# Patient Record
Sex: FEMALE | Employment: UNEMPLOYED | URBAN - METROPOLITAN AREA
[De-identification: names, ages, dates, MRNs, and addresses within clinical notes are randomized per-mention and may not be internally consistent; named-entity substitution may affect disease eponyms.]

---

## 2019-04-25 ENCOUNTER — APPOINTMENT (OUTPATIENT)
Dept: ULTRASOUND IMAGING | Age: 29
End: 2019-04-25
Payer: COMMERCIAL

## 2019-04-25 ENCOUNTER — HOSPITAL ENCOUNTER (INPATIENT)
Age: 29
LOS: 1 days | Discharge: HOME | DRG: 531 | End: 2019-04-27
Attending: EMERGENCY MEDICINE | Admitting: OBSTETRICS & GYNECOLOGY
Payer: COMMERCIAL

## 2019-04-25 ENCOUNTER — HOSPITAL ENCOUNTER (EMERGENCY)
Age: 29
Discharge: ANOTHER HOSPITAL | End: 2019-04-25
Attending: EMERGENCY MEDICINE
Payer: COMMERCIAL

## 2019-04-25 ENCOUNTER — APPOINTMENT (OUTPATIENT)
Dept: CT IMAGING | Age: 29
End: 2019-04-25
Payer: COMMERCIAL

## 2019-04-25 VITALS
OXYGEN SATURATION: 100 % | TEMPERATURE: 98.5 F | BODY MASS INDEX: 40.18 KG/M2 | HEART RATE: 73 BPM | SYSTOLIC BLOOD PRESSURE: 132 MMHG | HEIGHT: 66 IN | DIASTOLIC BLOOD PRESSURE: 83 MMHG | RESPIRATION RATE: 16 BRPM | WEIGHT: 250 LBS

## 2019-04-25 DIAGNOSIS — N70.93 TOA (TUBO-OVARIAN ABSCESS): ICD-10-CM

## 2019-04-25 DIAGNOSIS — R10.2 PELVIC PAIN: Primary | ICD-10-CM

## 2019-04-25 DIAGNOSIS — R10.2 PELVIC PAIN: ICD-10-CM

## 2019-04-25 DIAGNOSIS — N83.299 OVARIAN CYST, COMPLEX: Primary | ICD-10-CM

## 2019-04-25 LAB
A/G RATIO: 1.1 (ref 1.1–2.2)
ALBUMIN SERPL-MCNC: 3.8 G/DL (ref 3.4–5)
ALP BLD-CCNC: 74 U/L (ref 40–129)
ALT SERPL-CCNC: 14 U/L (ref 10–40)
ANION GAP SERPL CALCULATED.3IONS-SCNC: 7 MMOL/L (ref 3–16)
AST SERPL-CCNC: 17 U/L (ref 15–37)
BACTERIA WET PREP: NORMAL
BACTERIA: ABNORMAL /HPF
BASOPHILS ABSOLUTE: 0.1 K/UL (ref 0–0.2)
BASOPHILS RELATIVE PERCENT: 0.9 %
BILIRUB SERPL-MCNC: 0.3 MG/DL (ref 0–1)
BILIRUBIN URINE: NEGATIVE
BLOOD, URINE: NEGATIVE
BUN BLDV-MCNC: 7 MG/DL (ref 7–20)
CALCIUM SERPL-MCNC: 8.9 MG/DL (ref 8.3–10.6)
CHLORIDE BLD-SCNC: 107 MMOL/L (ref 99–110)
CLARITY: CLEAR
CLUE CELLS: NORMAL
CO2: 22 MMOL/L (ref 21–32)
COLOR: YELLOW
CREAT SERPL-MCNC: 0.7 MG/DL (ref 0.6–1.1)
EOSINOPHILS ABSOLUTE: 0.2 K/UL (ref 0–0.6)
EOSINOPHILS RELATIVE PERCENT: 1.6 %
EPITHELIAL CELLS WET PREP: NORMAL
EPITHELIAL CELLS, UA: ABNORMAL /HPF
GFR AFRICAN AMERICAN: >60
GFR NON-AFRICAN AMERICAN: >60
GLOBULIN: 3.6 G/DL
GLUCOSE BLD-MCNC: 85 MG/DL (ref 70–99)
GLUCOSE URINE: NEGATIVE MG/DL
HCG QUALITATIVE: NEGATIVE
HCT VFR BLD CALC: 42.3 % (ref 36–48)
HEMOGLOBIN: 14.4 G/DL (ref 12–16)
KETONES, URINE: NEGATIVE MG/DL
LACTIC ACID, SEPSIS: 1 MMOL/L (ref 0.4–1.9)
LEUKOCYTE ESTERASE, URINE: NEGATIVE
LIPASE: 39 U/L (ref 13–60)
LYMPHOCYTES ABSOLUTE: 2.2 K/UL (ref 1–5.1)
LYMPHOCYTES RELATIVE PERCENT: 23.4 %
MCH RBC QN AUTO: 28.9 PG (ref 26–34)
MCHC RBC AUTO-ENTMCNC: 33.9 G/DL (ref 31–36)
MCV RBC AUTO: 85.1 FL (ref 80–100)
MICROSCOPIC EXAMINATION: YES
MONOCYTES ABSOLUTE: 0.7 K/UL (ref 0–1.3)
MONOCYTES RELATIVE PERCENT: 7.8 %
MUCUS: ABNORMAL /LPF
NEUTROPHILS ABSOLUTE: 6.2 K/UL (ref 1.7–7.7)
NEUTROPHILS RELATIVE PERCENT: 66.3 %
NITRITE, URINE: NEGATIVE
PDW BLD-RTO: 14.1 % (ref 12.4–15.4)
PH UA: 7 (ref 5–8)
PLATELET # BLD: 255 K/UL (ref 135–450)
PMV BLD AUTO: 7.3 FL (ref 5–10.5)
POTASSIUM SERPL-SCNC: 3.9 MMOL/L (ref 3.5–5.1)
PROTEIN UA: 30 MG/DL
RBC # BLD: 4.97 M/UL (ref 4–5.2)
RBC UA: ABNORMAL /HPF (ref 0–2)
RBC WET PREP: NORMAL
SODIUM BLD-SCNC: 136 MMOL/L (ref 136–145)
SOURCE WET PREP: NORMAL
SPECIFIC GRAVITY UA: 1.02 (ref 1–1.03)
TOTAL PROTEIN: 7.4 G/DL (ref 6.4–8.2)
TRICHOMONAS PREP: NORMAL
URINE REFLEX TO CULTURE: ABNORMAL
URINE TYPE: ABNORMAL
UROBILINOGEN, URINE: 0.2 E.U./DL
WBC # BLD: 9.4 K/UL (ref 4–11)
WBC UA: ABNORMAL /HPF (ref 0–5)
WBC WET PREP: NORMAL
YEAST WET PREP: NORMAL

## 2019-04-25 PROCEDURE — 96365 THER/PROPH/DIAG IV INF INIT: CPT

## 2019-04-25 PROCEDURE — 83605 ASSAY OF LACTIC ACID: CPT

## 2019-04-25 PROCEDURE — 87210 SMEAR WET MOUNT SALINE/INK: CPT

## 2019-04-25 PROCEDURE — 84703 CHORIONIC GONADOTROPIN ASSAY: CPT

## 2019-04-25 PROCEDURE — 80053 COMPREHEN METABOLIC PANEL: CPT

## 2019-04-25 PROCEDURE — 76830 TRANSVAGINAL US NON-OB: CPT

## 2019-04-25 PROCEDURE — 87491 CHLMYD TRACH DNA AMP PROBE: CPT

## 2019-04-25 PROCEDURE — 36415 COLL VENOUS BLD VENIPUNCTURE: CPT

## 2019-04-25 PROCEDURE — 96375 TX/PRO/DX INJ NEW DRUG ADDON: CPT

## 2019-04-25 PROCEDURE — 93975 VASCULAR STUDY: CPT

## 2019-04-25 PROCEDURE — 85025 COMPLETE CBC W/AUTO DIFF WBC: CPT

## 2019-04-25 PROCEDURE — 99285 EMERGENCY DEPT VISIT HI MDM: CPT

## 2019-04-25 PROCEDURE — 2580000003 HC RX 258: Performed by: NURSE PRACTITIONER

## 2019-04-25 PROCEDURE — 99284 EMERGENCY DEPT VISIT MOD MDM: CPT

## 2019-04-25 PROCEDURE — 96361 HYDRATE IV INFUSION ADD-ON: CPT

## 2019-04-25 PROCEDURE — 6360000004 HC RX CONTRAST MEDICATION: Performed by: NURSE PRACTITIONER

## 2019-04-25 PROCEDURE — 83690 ASSAY OF LIPASE: CPT

## 2019-04-25 PROCEDURE — 74177 CT ABD & PELVIS W/CONTRAST: CPT

## 2019-04-25 PROCEDURE — 6370000000 HC RX 637 (ALT 250 FOR IP): Performed by: NURSE PRACTITIONER

## 2019-04-25 PROCEDURE — 81001 URINALYSIS AUTO W/SCOPE: CPT

## 2019-04-25 PROCEDURE — 6360000002 HC RX W HCPCS: Performed by: NURSE PRACTITIONER

## 2019-04-25 PROCEDURE — 87591 N.GONORRHOEAE DNA AMP PROB: CPT

## 2019-04-25 PROCEDURE — 76856 US EXAM PELVIC COMPLETE: CPT

## 2019-04-25 RX ORDER — ONDANSETRON 2 MG/ML
4 INJECTION INTRAMUSCULAR; INTRAVENOUS EVERY 30 MIN PRN
Status: DISCONTINUED | OUTPATIENT
Start: 2019-04-25 | End: 2019-04-25 | Stop reason: HOSPADM

## 2019-04-25 RX ORDER — KETOROLAC TROMETHAMINE 30 MG/ML
30 INJECTION, SOLUTION INTRAMUSCULAR; INTRAVENOUS ONCE
Status: COMPLETED | OUTPATIENT
Start: 2019-04-25 | End: 2019-04-25

## 2019-04-25 RX ORDER — 0.9 % SODIUM CHLORIDE 0.9 %
1000 INTRAVENOUS SOLUTION INTRAVENOUS ONCE
Status: COMPLETED | OUTPATIENT
Start: 2019-04-25 | End: 2019-04-25

## 2019-04-25 RX ORDER — DOXYCYCLINE HYCLATE 100 MG
100 TABLET ORAL ONCE
Status: COMPLETED | OUTPATIENT
Start: 2019-04-25 | End: 2019-04-25

## 2019-04-25 RX ADMIN — KETOROLAC TROMETHAMINE 30 MG: 30 INJECTION, SOLUTION INTRAMUSCULAR at 17:52

## 2019-04-25 RX ADMIN — IOPAMIDOL 75 ML: 755 INJECTION, SOLUTION INTRAVENOUS at 18:39

## 2019-04-25 RX ADMIN — DOXYCYCLINE HYCLATE 100 MG: 100 TABLET, COATED ORAL at 21:47

## 2019-04-25 RX ADMIN — CEFTRIAXONE 2 G: 2 INJECTION, POWDER, FOR SOLUTION INTRAMUSCULAR; INTRAVENOUS at 21:47

## 2019-04-25 RX ADMIN — ONDANSETRON 4 MG: 2 INJECTION INTRAMUSCULAR; INTRAVENOUS at 17:52

## 2019-04-25 RX ADMIN — SODIUM CHLORIDE 1000 ML: 9 INJECTION, SOLUTION INTRAVENOUS at 17:52

## 2019-04-25 ASSESSMENT — ENCOUNTER SYMPTOMS
VOMITING: 0
ABDOMINAL PAIN: 1
CHEST TIGHTNESS: 0
DIARRHEA: 0
SHORTNESS OF BREATH: 0
NAUSEA: 0

## 2019-04-25 ASSESSMENT — PAIN SCALES - GENERAL
PAINLEVEL_OUTOF10: 3
PAINLEVEL_OUTOF10: 5
PAINLEVEL_OUTOF10: 3
PAINLEVEL_OUTOF10: 5

## 2019-04-25 ASSESSMENT — PAIN DESCRIPTION - PAIN TYPE: TYPE: ACUTE PAIN

## 2019-04-25 ASSESSMENT — PAIN DESCRIPTION - FREQUENCY: FREQUENCY: CONTINUOUS

## 2019-04-25 ASSESSMENT — PAIN DESCRIPTION - DESCRIPTORS: DESCRIPTORS: ACHING;DULL

## 2019-04-25 ASSESSMENT — PAIN DESCRIPTION - LOCATION
LOCATION: ABDOMEN
LOCATION: ABDOMEN

## 2019-04-25 ASSESSMENT — PAIN DESCRIPTION - ORIENTATION: ORIENTATION: RIGHT;LOWER

## 2019-04-25 NOTE — ED NOTES
Report given to Felix Singleton, [facility].       Jyoti Gatica RN  04/25/19 6161

## 2019-04-25 NOTE — ED NOTES
Patient medicated per [facility] for pain and nausea. Pt tolerated well. Will continue to monitor.       Berry Weeks RN  04/25/19 5405

## 2019-04-25 NOTE — ED NOTES
Pt to [address] [facility] [address] via stretcher.       Corrine Calderon RN  04/25/19 1945

## 2019-04-26 PROBLEM — N70.93 TOA (TUBO-OVARIAN ABSCESS): Status: ACTIVE | Noted: 2019-04-26

## 2019-04-26 LAB
BASOPHILS ABSOLUTE: 0 K/UL (ref 0–0.2)
BASOPHILS ABSOLUTE: 0.1 K/UL (ref 0–0.2)
BASOPHILS RELATIVE PERCENT: 0.5 %
BASOPHILS RELATIVE PERCENT: 0.7 %
C TRACH DNA GENITAL QL NAA+PROBE: NEGATIVE
EOSINOPHILS ABSOLUTE: 0.1 K/UL (ref 0–0.6)
EOSINOPHILS ABSOLUTE: 0.2 K/UL (ref 0–0.6)
EOSINOPHILS RELATIVE PERCENT: 1.3 %
EOSINOPHILS RELATIVE PERCENT: 2.1 %
HCT VFR BLD CALC: 37.8 % (ref 36–48)
HCT VFR BLD CALC: 38.7 % (ref 36–48)
HEMOGLOBIN: 12.8 G/DL (ref 12–16)
HEMOGLOBIN: 13.3 G/DL (ref 12–16)
LYMPHOCYTES ABSOLUTE: 2.2 K/UL (ref 1–5.1)
LYMPHOCYTES ABSOLUTE: 3 K/UL (ref 1–5.1)
LYMPHOCYTES RELATIVE PERCENT: 24.8 %
LYMPHOCYTES RELATIVE PERCENT: 32.3 %
MCH RBC QN AUTO: 28.4 PG (ref 26–34)
MCH RBC QN AUTO: 28.8 PG (ref 26–34)
MCHC RBC AUTO-ENTMCNC: 33.9 G/DL (ref 31–36)
MCHC RBC AUTO-ENTMCNC: 34.4 G/DL (ref 31–36)
MCV RBC AUTO: 83.6 FL (ref 80–100)
MCV RBC AUTO: 83.8 FL (ref 80–100)
MONOCYTES ABSOLUTE: 0.7 K/UL (ref 0–1.3)
MONOCYTES ABSOLUTE: 0.8 K/UL (ref 0–1.3)
MONOCYTES RELATIVE PERCENT: 7.4 %
MONOCYTES RELATIVE PERCENT: 9.1 %
N. GONORRHOEAE DNA: NEGATIVE
NEUTROPHILS ABSOLUTE: 5.3 K/UL (ref 1.7–7.7)
NEUTROPHILS ABSOLUTE: 5.7 K/UL (ref 1.7–7.7)
NEUTROPHILS RELATIVE PERCENT: 57.5 %
NEUTROPHILS RELATIVE PERCENT: 64.3 %
PDW BLD-RTO: 13.9 % (ref 12.4–15.4)
PDW BLD-RTO: 14 % (ref 12.4–15.4)
PLATELET # BLD: 209 K/UL (ref 135–450)
PLATELET # BLD: 241 K/UL (ref 135–450)
PMV BLD AUTO: 7.2 FL (ref 5–10.5)
PMV BLD AUTO: 7.6 FL (ref 5–10.5)
RBC # BLD: 4.51 M/UL (ref 4–5.2)
RBC # BLD: 4.64 M/UL (ref 4–5.2)
SPECIMEN STATUS: NORMAL
WBC # BLD: 8.8 K/UL (ref 4–11)
WBC # BLD: 9.2 K/UL (ref 4–11)

## 2019-04-26 PROCEDURE — 2580000003 HC RX 258: Performed by: OBSTETRICS & GYNECOLOGY

## 2019-04-26 PROCEDURE — 36415 COLL VENOUS BLD VENIPUNCTURE: CPT

## 2019-04-26 PROCEDURE — 99232 SBSQ HOSP IP/OBS MODERATE 35: CPT | Performed by: OBSTETRICS & GYNECOLOGY

## 2019-04-26 PROCEDURE — 1200000000 HC SEMI PRIVATE

## 2019-04-26 PROCEDURE — 6360000002 HC RX W HCPCS: Performed by: OBSTETRICS & GYNECOLOGY

## 2019-04-26 PROCEDURE — 85025 COMPLETE CBC W/AUTO DIFF WBC: CPT

## 2019-04-26 PROCEDURE — 87086 URINE CULTURE/COLONY COUNT: CPT

## 2019-04-26 PROCEDURE — 6370000000 HC RX 637 (ALT 250 FOR IP): Performed by: OBSTETRICS & GYNECOLOGY

## 2019-04-26 RX ORDER — ONDANSETRON 2 MG/ML
4 INJECTION INTRAMUSCULAR; INTRAVENOUS EVERY 6 HOURS PRN
Status: DISCONTINUED | OUTPATIENT
Start: 2019-04-25 | End: 2019-04-27 | Stop reason: HOSPADM

## 2019-04-26 RX ORDER — DOXYCYCLINE HYCLATE 100 MG
100 TABLET ORAL EVERY 12 HOURS SCHEDULED
Status: DISCONTINUED | OUTPATIENT
Start: 2019-04-26 | End: 2019-04-27 | Stop reason: HOSPADM

## 2019-04-26 RX ORDER — SODIUM CHLORIDE 0.9 % (FLUSH) 0.9 %
10 SYRINGE (ML) INJECTION EVERY 12 HOURS SCHEDULED
Status: DISCONTINUED | OUTPATIENT
Start: 2019-04-26 | End: 2019-04-27 | Stop reason: HOSPADM

## 2019-04-26 RX ORDER — ACETAMINOPHEN 325 MG/1
650 TABLET ORAL EVERY 4 HOURS PRN
Status: DISCONTINUED | OUTPATIENT
Start: 2019-04-25 | End: 2019-04-27 | Stop reason: HOSPADM

## 2019-04-26 RX ORDER — SODIUM CHLORIDE 0.9 % (FLUSH) 0.9 %
10 SYRINGE (ML) INJECTION PRN
Status: DISCONTINUED | OUTPATIENT
Start: 2019-04-25 | End: 2019-04-27 | Stop reason: HOSPADM

## 2019-04-26 RX ORDER — OXYCODONE HYDROCHLORIDE AND ACETAMINOPHEN 5; 325 MG/1; MG/1
1 TABLET ORAL EVERY 4 HOURS PRN
Status: DISCONTINUED | OUTPATIENT
Start: 2019-04-25 | End: 2019-04-27 | Stop reason: HOSPADM

## 2019-04-26 RX ORDER — NICOTINE 21 MG/24HR
1 PATCH, TRANSDERMAL 24 HOURS TRANSDERMAL DAILY
Status: DISCONTINUED | OUTPATIENT
Start: 2019-04-26 | End: 2019-04-27 | Stop reason: HOSPADM

## 2019-04-26 RX ORDER — SODIUM CHLORIDE 9 MG/ML
INJECTION, SOLUTION INTRAVENOUS
Status: DISPENSED
Start: 2019-04-26 | End: 2019-04-26

## 2019-04-26 RX ORDER — KETOROLAC TROMETHAMINE 30 MG/ML
30 INJECTION, SOLUTION INTRAMUSCULAR; INTRAVENOUS EVERY 6 HOURS
Status: DISCONTINUED | OUTPATIENT
Start: 2019-04-26 | End: 2019-04-27 | Stop reason: HOSPADM

## 2019-04-26 RX ORDER — DOCUSATE SODIUM 100 MG/1
100 CAPSULE, LIQUID FILLED ORAL 2 TIMES DAILY
Status: DISCONTINUED | OUTPATIENT
Start: 2019-04-26 | End: 2019-04-27 | Stop reason: HOSPADM

## 2019-04-26 RX ADMIN — DOXYCYCLINE HYCLATE 100 MG: 100 TABLET, COATED ORAL at 09:38

## 2019-04-26 RX ADMIN — ENOXAPARIN SODIUM 40 MG: 40 INJECTION SUBCUTANEOUS at 09:38

## 2019-04-26 RX ADMIN — KETOROLAC TROMETHAMINE 30 MG: 30 INJECTION, SOLUTION INTRAMUSCULAR at 06:30

## 2019-04-26 RX ADMIN — DOCUSATE SODIUM 100 MG: 100 CAPSULE, LIQUID FILLED ORAL at 20:43

## 2019-04-26 RX ADMIN — OXYCODONE HYDROCHLORIDE AND ACETAMINOPHEN 1 TABLET: 5; 325 TABLET ORAL at 20:43

## 2019-04-26 RX ADMIN — KETOROLAC TROMETHAMINE 30 MG: 30 INJECTION, SOLUTION INTRAMUSCULAR at 13:05

## 2019-04-26 RX ADMIN — DOCUSATE SODIUM 100 MG: 100 CAPSULE, LIQUID FILLED ORAL at 09:38

## 2019-04-26 RX ADMIN — CEFOXITIN SODIUM 2 G: 1 POWDER, FOR SOLUTION INTRAVENOUS at 01:28

## 2019-04-26 RX ADMIN — SODIUM CHLORIDE, PRESERVATIVE FREE 10 ML: 5 INJECTION INTRAVENOUS at 20:44

## 2019-04-26 RX ADMIN — DOCUSATE SODIUM 100 MG: 100 CAPSULE, LIQUID FILLED ORAL at 01:27

## 2019-04-26 RX ADMIN — ONDANSETRON 4 MG: 2 INJECTION INTRAMUSCULAR; INTRAVENOUS at 07:33

## 2019-04-26 RX ADMIN — SODIUM CHLORIDE, PRESERVATIVE FREE 10 ML: 5 INJECTION INTRAVENOUS at 09:39

## 2019-04-26 RX ADMIN — DOXYCYCLINE HYCLATE 100 MG: 100 TABLET, COATED ORAL at 20:43

## 2019-04-26 RX ADMIN — CEFOXITIN SODIUM 2 G: 1 POWDER, FOR SOLUTION INTRAVENOUS at 18:09

## 2019-04-26 RX ADMIN — KETOROLAC TROMETHAMINE 30 MG: 30 INJECTION, SOLUTION INTRAMUSCULAR at 18:09

## 2019-04-26 RX ADMIN — KETOROLAC TROMETHAMINE 30 MG: 30 INJECTION, SOLUTION INTRAMUSCULAR at 01:27

## 2019-04-26 RX ADMIN — CEFOXITIN SODIUM 2 G: 1 POWDER, FOR SOLUTION INTRAVENOUS at 13:06

## 2019-04-26 RX ADMIN — CEFOXITIN SODIUM 2 G: 1 POWDER, FOR SOLUTION INTRAVENOUS at 06:30

## 2019-04-26 RX ADMIN — OXYCODONE HYDROCHLORIDE AND ACETAMINOPHEN 1 TABLET: 5; 325 TABLET ORAL at 01:27

## 2019-04-26 ASSESSMENT — ENCOUNTER SYMPTOMS
VOMITING: 1
ABDOMINAL DISTENTION: 0
NAUSEA: 1
SHORTNESS OF BREATH: 0
ABDOMINAL PAIN: 1

## 2019-04-26 ASSESSMENT — PAIN SCALES - GENERAL
PAINLEVEL_OUTOF10: 6
PAINLEVEL_OUTOF10: 6
PAINLEVEL_OUTOF10: 4
PAINLEVEL_OUTOF10: 7
PAINLEVEL_OUTOF10: 4
PAINLEVEL_OUTOF10: 6
PAINLEVEL_OUTOF10: 6

## 2019-04-26 ASSESSMENT — PAIN DESCRIPTION - DIRECTION: RADIATING_TOWARDS: LEFT SIDE

## 2019-04-26 ASSESSMENT — PAIN DESCRIPTION - LOCATION: LOCATION: ABDOMEN

## 2019-04-26 ASSESSMENT — PAIN DESCRIPTION - DESCRIPTORS: DESCRIPTORS: ACHING;DULL

## 2019-04-26 ASSESSMENT — PAIN DESCRIPTION - ORIENTATION: ORIENTATION: RIGHT;LOWER

## 2019-04-26 ASSESSMENT — PAIN DESCRIPTION - PAIN TYPE: TYPE: ACUTE PAIN

## 2019-04-26 ASSESSMENT — PAIN DESCRIPTION - FREQUENCY: FREQUENCY: CONTINUOUS

## 2019-04-26 NOTE — ED NOTES
Pt report given to Mojica Christian . Pt is going to their ER by private vehicle. Pt transfer form sent with pt.       Angelina Obregon RN  04/25/19 3310

## 2019-04-26 NOTE — PROGRESS NOTES
Got report from [facility] in ED. Pt admitted to c330 in stable condition via wheelchair. VSS. Oriented to room and call light and schedule of things. Bed locked and in lowest position. Call light and bedside table within reach. Will continue to monitor.

## 2019-04-26 NOTE — ED PROVIDER NOTES
I independently performed a history and physical on Gustavo Vigil. All diagnostic, treatment, and disposition decisions were made by myself in conjunction with the advanced practice provider. Briefly, this is a 33 y.o. female here for right lower quadrant pain for the last month. The patient states the pain is getting steadily worse. .    On exam, patient did have findings for an abnormal cyst on ultrasound, and we do feel the patient will be transferred to [facility] for evaluation of the possible tubo-ovarian abscess. Patient was started on antibiotics here in the emergency department.     For further details of [facility] emergency department encounter, please see documentation by advanced practice provider Sasha Leigh MD  04/26/19 3127

## 2019-04-26 NOTE — ED NOTES
Admitting physician at bedside assessing patient and discussing plan of care. Patient is alert and oriented and answering questions.       Felder Liming, [place]  04/26/19 3639

## 2019-04-26 NOTE — PROGRESS NOTES
4 Eyes Skin Assessment     The patient is being assess for  Admission    I agree that 2 RN's have performed a thorough Head to Toe Skin Assessment on the patient. ALL assessment sites listed below have been assessed. Areas assessed by both nurses:    [x]   Head, Face, and Ears   [x]   Shoulders, Back, and Chest  [x]   Arms, Elbows, and Hands   [x]   Coccyx, Sacrum, and Ischum  [x]   Legs, Feet, and Heels        Does the Patient have Skin Breakdown?   No         Agusto Prevention initiated:  No   Wound Care Orders initiated:  No      [facility] nurse consulted for Pressure Injury (Stage 3,4, Unstageable, DTI, NWPT, and Complex wounds):  No      Nurse 1 eSignature: Electronically signed by Jose L Chua RN on 4/26/19 at 6:41 AM    **SHARE this note so that the co-signing nurse is able to place an eSignature**    Nurse 2 eSignature: Electronically signed by Sinai Avila RN on 4/26/19 at 6:42 AM

## 2019-04-26 NOTE — H&P
[facility]  Inpatient GYN History and Physical     CC:   Chief Complaint   Patient presents with    Abdominal Pain     pt was evaluated at [place], has cysts on ovaries, was sent here by [facility] to be admitted     Reason for consultation: Pelvic pain, BL ovarian cysts on US and CT     HPI: Julio Villalobos is a 33 y.o. [id number] female who presents after transfer from [facility] ED ([facility]). She presents with complaints of left sided pelvic pain that radiated to her pubic region and into her vagina. States pain has been present for a couple months but worsened 2 weeks ago. States she has been complaining about these issues at home but hasn't \"been heard\". States she has been feeling \"heaviness in abdomen\" and like there \"is a tampon or something stuck in her vagina\" with pressure. Pain became acute earlier today when she sat down from putting daughter down for nap. Pain was severe at that time. Felt nauseous and emesis x 5. Denies fevers / chills. Denies discharge / VB. After pain medication, now 5/10. States pain appeared to be exacerbated by sitting. States intercourse has been very very painful. Vaginal exam at [facility] ED was also very painful, especially when her cervix was palpated. LMP 3/25/19, about a month ago. 6/30 day cycles. Very heavy flow. Pt is sexually active with her . Use Nexplanon for birth control (8/2017). Denies STI. Admits to very painful intercourse recently. Denies hx of PCOS, Endometriosis or PID. She is a [id number], s/p NSVDs. Denies any abdominal or vaginal surgeries in past. States she feels safe at home. Lives with  and two children. Pt is a daily tobacco user / Georgina Deed. Received Cefotetan and Doxy at ED prior to transfer. Review of Systems:   Review of Systems   Constitutional: Negative for chills, fatigue and fever. Respiratory: Negative for shortness of breath. Cardiovascular: Negative for chest pain and palpitations.    Gastrointestinal: Positive for abdominal pain, nausea and vomiting. Negative for abdominal distention. Genitourinary: Positive for dyspareunia, menstrual problem, pelvic pain and vaginal pain. Negative for dysuria. Skin: Negative for wound. Neurological: Positive for dizziness. Negative for seizures and weakness. Psychiatric/Behavioral: Negative for behavioral problems, dysphoric mood and sleep disturbance.      OBGYN Provider : NONE     Gynecologic History:   Denies history of abnormal pap smears  - cannot recall when her last PAP was done but thinks it was her last pregnancy (19 mos)   Denies history of STIs       Obstetrical History:  OB History    None         Past Medical History:   Past Medical History:   Diagnosis Date    Hypertension        Medications:  Current Facility-Administered Medications   Medication Dose Route Frequency Provider Last Rate Last Dose    sodium chloride flush 0.9 % injection 10 mL  10 mL Intravenous 2 times per day Duaine Cecelia, DO        sodium chloride flush 0.9 % injection 10 mL  10 mL Intravenous PRN Duaine Cecelia, DO        acetaminophen (TYLENOL) tablet 650 mg  650 mg Oral Q4H PRN Ji Cecelia, DO        docusate sodium (COLACE) capsule 100 mg  100 mg Oral BID Ji Peckity, DO   100 mg at 04/26/19 0127    ondansetron (ZOFRAN) injection 4 mg  4 mg Intravenous Q6H PRN Jerardoaine Cecelia, DO        enoxaparin (LOVENOX) injection 40 mg  40 mg Subcutaneous Daily Ji Peckity, DO        cefOXitin (MEFOXIN) 2 g in dextrose 5 % 50 mL IVPB  2 g Intravenous 4 times per day Duaine Cecelia,  mL/hr at 04/26/19 0128 2 g at 04/26/19 0128    And    doxycycline hyclate (VIBRA-TABS) tablet 100 mg  100 mg Oral 2 times per day Jerardoaine Cecelia, DO        oxyCODONE-acetaminophen (PERCOCET) 5-325 MG per tablet 1 tablet  1 tablet Oral Q4H PRN Duaine Cecelia, DO   1 tablet at 04/26/19 0127    HYDROmorphone (DILAUDID) injection 0.5 mg  0.5 mg Intravenous Q3H 04/25/2019, Discharged on 04/25/2019   Component Date Value    WBC 04/25/2019 9.4     RBC 04/25/2019 4.97     Hemoglobin 04/25/2019 14.4     Hematocrit 04/25/2019 42.3     MCV 04/25/2019 85.1     MCH 04/25/2019 28.9     MCHC 04/25/2019 33.9     RDW 04/25/2019 14.1     Platelets 39/10/8545 255     MPV 04/25/2019 7.3     Neutrophils % 04/25/2019 66.3     Lymphocytes % 04/25/2019 23.4     Monocytes % 04/25/2019 7.8     Eosinophils % 04/25/2019 1.6     Basophils % 04/25/2019 0.9     Neutrophils # 04/25/2019 6.2     Lymphocytes # 04/25/2019 2.2     Monocytes # 04/25/2019 0.7     Eosinophils # 04/25/2019 0.2     Basophils # 04/25/2019 0.1     Sodium 04/25/2019 136     Potassium 04/25/2019 3.9     Chloride 04/25/2019 107     CO2 04/25/2019 22     Anion Gap 04/25/2019 7     Glucose 04/25/2019 85     BUN 04/25/2019 7     CREATININE 04/25/2019 0.7     GFR Non- 04/25/2019 >60     GFR  04/25/2019 >60     Calcium 04/25/2019 8.9     Total Protein 04/25/2019 7.4     Alb 04/25/2019 3.8     Albumin/Globulin Ratio 04/25/2019 1.1     Total Bilirubin 04/25/2019 0.3     Alkaline Phosphatase 04/25/2019 74     ALT 04/25/2019 14     AST 04/25/2019 17     Globulin 04/25/2019 3.6     Lactic Acid, Sepsis 04/25/2019 1.0     hCG Qual 04/25/2019 Negative     Lipase 04/25/2019 39.0     Color, UA 04/25/2019 Yellow     Clarity, UA 04/25/2019 Clear     Glucose, Ur 04/25/2019 Negative     Bilirubin Urine 04/25/2019 Negative     Ketones, Urine 04/25/2019 Negative     Specific Gravity, UA 04/25/2019 1.020     Blood, Urine 04/25/2019 Negative     pH, UA 04/25/2019 7.0     Protein, UA 04/25/2019 30*    Urobilinogen, Urine 04/25/2019 0.2     Nitrite, Urine 04/25/2019 Negative     Leukocyte Esterase, Urine 04/25/2019 Negative     Microscopic Examination 04/25/2019 YES     Urine Reflex to Culture 04/25/2019 Not Indicated     Urine Type 04/25/2019 Not Specified     Trichomonas Prep 04/25/2019 None Seen     Yeast, Wet Prep 04/25/2019 None Seen     Clue Cells, Wet Prep 04/25/2019 None Seen     WBC, Wet Prep 04/25/2019 <1+     RBC, Wet Prep 04/25/2019 <1+     Epi Cells 04/25/2019 <1+     Bacteria 04/25/2019 <1+     Source [employer] Prep 04/25/2019 Vaginal     Mucus, UA 04/25/2019 2+*    WBC, UA 04/25/2019 3-5     RBC, UA 04/25/2019 0-2     Epi Cells 04/25/2019 10-20     Bacteria, UA 04/25/2019 1+*       Radiology:  EXAMINATION:   PELVIC ULTRASOUND; DOPPLER EVALUATION OF THE PELVIS       4/25/2019       TECHNIQUE:   Transvaginal pelvic ultrasound was performed.; DOPPLER ULTRASOUND OF THE   PELVIS  Color Doppler evaluation was performed.       COMPARISON:   None       HISTORY:   ORDERING SYSTEM PROVIDED HISTORY: right sided pelvic pain; ORDERING SYSTEM   PROVIDED HISTORY: right sided pelvic pain   TECHNOLOGIST PROVIDED HISTORY:   Ordering Physician Provided Reason for Exam: rlq pain r/o torsion   Acuity: Acute; ORDERING SYSTEM PROVIDED HISTORY: ovarian torsion   TECHNOLOGIST PROVIDED HISTORY:   Ordering Physician Provided Reason for Exam: rlq pain r/o torsion   Acuity: Acute       FINDINGS:       Measurements:       Uterus:  11.0 x 4.8 x 6.6 cm       Endometrial stripe:  16 mm       Right Ovary:  7.9 x 5.4 x 5.4 cm       Left Ovary:  5.8 x 3.7 x 4.2 cm           Ultrasound Findings:       Uterus: Uterus demonstrates normal myometrial echotexture.       Endometrial stripe: Endometrial stripe is within normal limits.       Right Ovary: Within the right ovary, there are at least 2 complex cysts. Largest measures 5.8 x 3.9 x 4.7 cm.  Thin septations are noted.  Smaller   cyst measures 3.1 x 2.5 x 2.7 cm.  There is normal arterial and venous   doppler flow.       Left Ovary: Nga Melissa is a complex cyst with septations and mural nodularity,   measuring 3.7 x 3.1 x 3.0 cm.  No Doppler flow within the nodule is detected.    There is normal arterial and venous doppler flow.       Free Fluid: No evidence of free fluid.           Impression   Complex, large cysts within both ovaries.       On the right, largest measures 5.8 cm, with multiple thin septations.       On the left, there is a complex cyst with septations and mural nodularity,   measuring up to 3.7 cm.  No flow within the mural nodules are found.       Cysts are nonspecific, and may represent complex physiologic ovarian cysts,   but benign and malignant adnexal cysts remain in the differential.   Tubo-ovarian abscess would also be considered, though felt to be less likely.       Based on the most recent recommendations, a follow-up MRI is recommended,   especially to evaluate the complex cyst with nodularity on the left. Gynecologic consultation recommended as well.       No sonographic evidence of torsion.         Narrative   EXAMINATION:   CT OF THE ABDOMEN AND PELVIS WITH CONTRAST 4/25/2019 6:36 pm       TECHNIQUE:   CT of the abdomen and pelvis was performed with the administration of   intravenous contrast. Multiplanar reformatted images are provided for review. Dose modulation, iterative reconstruction, and/or weight based adjustment of   the mA/kV was utilized to reduce the radiation dose to as low as reasonably   achievable.       COMPARISON:   CT abdomen/pelvis 06/09/2018       HISTORY:   ORDERING SYSTEM PROVIDED HISTORY: rlq pain   TECHNOLOGIST PROVIDED HISTORY:   If patient is on cardiac monitor and/or pulse ox, they may be taken off   cardiac monitor and pulse ox, left on O2 if currently on. All monitors   reattached when patient returns to room. Additional Contrast?->None   Ordering Physician Provided Reason for Exam: rlq pain x 1 day/ radiates into   groin   Acuity: Acute   Type of Exam: Initial       FINDINGS:   Lung bases are clear.       Small hiatal hernia.  Spleen is enlarged measuring 16 cm in AP dimension. Otherwise the liver, gallbladder, adrenal glands and pancreas unremarkable.    Left renal calculus 2-3 mm in size.  Right renal cyst measuring 1 cm in size.    No hydronephrosis.       Mild retained stool throughout the colon without bowel obstruction.  Minimal   sigmoid diverticulosis.       Mild prominence endometrial stripe of the uterus may be related to phase of   menstrual cycle.  Right ovarian cyst measuring 5.6 cm in size.  Left ovarian   cyst 3.2 cm in size.  This is associated with small amount of free fluid   within the dependent pelvis.  This likely physiologic finding.  Small fat   containing umbilical hernia.           Impression   No acute inflammatory process or evidence of bowel obstruction.       Left renal calculi up to 3 mm in size.  No evidence of obstructive uropathy.       Bilateral ovarian cysts up to 5.6 cm in size.  Based on size and   premenopausal status, follow-up pelvic ultrasound is recommended in 6-12   weeks.       Small amount of free fluid dependent pelvis most likely physiologic finding.       Mild splenomegaly.       Small fat containing umbilical hernia.         Assessment/Plan:   27yo [id number] here for pelvic pain / adnexal masses     1) Suspected Bilateral TOA   - TVUS and CT suggestive of B/L TOA   - consistent with findings on exam   - Vaginal GCCT pending / Wet prep (-)   - CBC within normal limits / absent leukocytosis (WBC 9.2)  - afebrile at both EDs    - Plan for 24 - 48 hrs of IV Antibiotics (Cefotetan and Doxycycline)   - Plan to re-scan pt with US re: adnexal masses   - if no interval improvement or fever / leukocytosis develops will alter abx   - do not plan for surgery at this time     2) Obesity   - BMI on admission 42.5     3) Tobacco Use - Vape   -  Replacement nicotine patch ordered, 14mg/day     4) PreHTN - not on meds   - BP on admission 152/94   - will continue to observe   - encouraged patient to establish with PCP      Cortez Prior, DO

## 2019-04-26 NOTE — ED PROVIDER NOTES
CHIEF COMPLAINT  Abdominal Pain (pt was evaluated at [place], has cysts on ovaries, was sent here by [place] to be admitted)      [facility]  Santi Wen is a 33 y.o. female who presents to the ED as transfer from [facility] for eval and likely admission to [facility]  Dr Marsha Blair aware. Possible TOA. Came by Private car pt has no complaints at this time. No other complaints, modifying factors or associated symptoms. Nursing notes reviewed. Past Medical History:   Diagnosis Date    Hypertension      History reviewed. No pertinent surgical history. History reviewed. No pertinent family history.   Social History     Socioeconomic History    Marital status:      Spouse name: Not on file    Number of children: Not on file    Years of education: Not on file    Highest education level: Not on file   Occupational History    Not on file   Social Needs    Financial resource strain: Not on file    Food insecurity:     Worry: Not on file     Inability: Not on file    Transportation needs:     Medical: Not on file     Non-medical: Not on file   Tobacco Use    Smoking status: Current Every Day Smoker     Packs/day: 0.50     Types: E-Cigarettes    Smokeless tobacco: Never Used   Substance and Sexual Activity    Alcohol use: No    Drug use: No    Sexual activity: Not on file   Lifestyle    Physical activity:     Days per week: Not on file     Minutes per session: Not on file    Stress: Not on file   Relationships    Social connections:     Talks on phone: Not on file     Gets together: Not on file     Attends [employer] service: Not on file     Active member of club or organization: Not on file     Attends meetings of clubs or organizations: Not on file     Relationship status: Not on file    Intimate partner violence:     Fear of current or ex partner: Not on file     Emotionally abused: Not on file     Physically abused: Not on file     Forced sexual activity: Not on file Other Topics Concern    Not on file   Social History Narrative    Not on file     Current Facility-Administered Medications   Medication Dose Route Frequency Provider Last Rate Last Dose    sodium chloride flush 0.9 % injection 10 mL  10 mL Intravenous 2 times per day Rupa Alise, DO        sodium chloride flush 0.9 % injection 10 mL  10 mL Intravenous PRN Rupa Alise, DO        acetaminophen (TYLENOL) tablet 650 mg  650 mg Oral Q4H PRN Rupa Alise, DO        docusate sodium (COLACE) capsule 100 mg  100 mg Oral BID Rupa Alise, DO        ondansetron [facility]) injection 4 mg  4 mg Intravenous Q6H PRN Rupa Alise, DO        enoxaparin (LOVENOX) injection 40 mg  40 mg Subcutaneous Daily Rupa Alise, DO        cefOXitin (MEFOXIN) 2 g in dextrose 5 % 50 mL IVPB  2 g Intravenous 4 times per day Rupa Alise, DO        And    doxycycline hyclate (VIBRA-TABS) tablet 100 mg  100 mg Oral 2 times per day Rupa Alise, DO        oxyCODONE-acetaminophen (PERCOCET) 5-325 MG per tablet 1 tablet  1 tablet Oral Q4H PRN Rupa Alise, DO        HYDROmorphone (DILAUDID) injection 0.5 mg  0.5 mg Intravenous Q3H PRN Rupa Alise, DO        ketorolac (TORADOL) injection 30 mg  30 mg Intravenous Q6H Tiffanie L Durbin,          No current outpatient medications on file. No Known Allergies    REVIEW OF SYSTEMS  6 systems reviewed, pertinent positives per HPI otherwise noted to be negative    PHYSICAL EXAM  BP (!) 152/94   Pulse 80   Temp 98.9 °F (37.2 °C) (Oral)   Resp 18   Wt 250 lb (113.4 kg)   LMP 03/25/2019   SpO2 98%   BMI 40.35 kg/m²   GENERAL APPEARANCE: Awake and alert. Cooperative. No acute distress. HEAD: Normocephalic. Atraumatic. EYES: PERRL. EOM's grossly intact. ENT: Mucous membranes are moist.   NECK: Supple. Normal ROM. CHEST: Equal symmetric chest rise. LUNGS: Breathing is unlabored. Speaking comfortably in full sentences. ABDOMEN: Soft, nondistended  EXTREMITIES: . MAEE. No acute deformities. All extremities neurovascularly intact. SKIN: Warm and dry. NEUROLOGICAL: Alert and oriented. Normal coordination. Gait normal.        ED COURSE/MDM  I saw the patient she was not wanting anything for pain at this time. Dr. Shen Cid who was the OB/GYN a call was paged and saw the patient and will admit her to her service. Patient has no complaints at this time she appeared pleasant. CLINICAL IMPRESSION  1. Pelvic pain    2. TOA (tubo-ovarian abscess)        Blood pressure (!) 152/94, pulse 80, temperature 98.9 °F (37.2 °C), temperature source Oral, resp. rate 18, weight 250 lb (113.4 kg), last menstrual period 03/25/2019, SpO2 98 %, not currently breastfeeding. DISPOSITION  Patient was discharged to home in good condition. This chart was generated in part by using Dragon Dictation system and may contain errors related to that system including errors in grammar, punctuation, and spelling, as well as words and phrases that may be inappropriate. When dictating, effort is made to correct spelling/grammar errors. If there are any questions or concerns please feel free to contact the dictating provider for clarification.      Rachael Monsalve DO  ATTENDING, [address] [facility], DO  04/28/19 [id number]

## 2019-04-26 NOTE — PROGRESS NOTES
Department of Gynecology  Attending Progress Note          SUBJECTIVE:  Julio Villalobos is a 33 y.o. [id number] female who presents after transfer from [facility]). She presents with complaints of left sided pelvic pain that radiated to her pubic region and into her vagina. States pain has been present for a couple months but worsened 2 weeks ago. States she has been complaining about these issues at home but hasn't \"been heard\". States she has been feeling \"heaviness in abdomen\" and like there \"is a tampon or something stuck in her vagina\" with pressure. Pain became acute on 4/25/19 when she sat down from putting daughter down for nap. Pain was severe at that time. Felt nauseous and emesis x 5. Denies fevers / chills. Denies discharge / VB. States intercourse has been very very painful. Vaginal exam at [facility] ED was also very painful, especially with cervical palpation. Since admission, pain increased 7/10 earlier this morning but has since improved 3/10. Has tolerated regular diet. Denies fever, chills, headache, vision changes, chest pain, shortness of breath, nausea, vomiting, diarrhea and constipation. Current results reviewed with patient:  Ultrasound, GC, Chl, CBC. LMP 3/25/19, about a month ago. 6/30 day cycles. Very heavy flow. Pt is sexually active with her . Uses Nexplanon for birth control (8/2017). Denies STI. Admits to very painful intercourse recently. Denies hx of PCOS, Endometriosis or PID. She is a [id number], s/p NSVDs. Denies any abdominal or vaginal surgeries in past. States she feels safe at home. Lives with  and two children. Pt is a daily tobacco user / Georgina Deed. No Known Allergies  No current facility-administered medications on file prior to encounter.       Current Outpatient Medications on File Prior to Encounter   Medication Sig Dispense Refill    etonogestrel (NEXPLANON) 68 MG implant 68 mg by Subdermal route See [employer] Instructions Birth control -       Past Medical History:   Diagnosis Date    Hypertension      Past Surgical History:   Procedure Laterality Date    DILATION AND CURETTAGE OF UTERUS  2008    WISDOM TOOTH EXTRACTION  2009     Social History     Socioeconomic History    Marital status:      Spouse name: Not on file    Number of children: Not on file    Years of education: Not on file    Highest education level: Not on file   Occupational History    Not on file   Social Needs    Financial resource strain: Not on file    Food insecurity:     Worry: Not on file     Inability: Not on file    Transportation needs:     Medical: Not on file     Non-medical: Not on file   Tobacco Use    Smoking status: Current Every Day Smoker     Packs/day: 0.50     Years: 3.00     Pack years: 1.50     Types: E-Cigarettes    Smokeless tobacco: Never Used   Substance and Sexual Activity    Alcohol use: Yes     Comment: social drinker    Drug use: No    Sexual activity: Yes     Partners: Male   Lifestyle    Physical activity:     Days per week: Not on file     Minutes per session: Not on file    Stress: Not on file   Relationships    Social connections:     Talks on phone: Not on file     Gets together: Not on file     Attends Rastafarian service: Not on file     Active member of club or organization: Not on file     Attends meetings of clubs or organizations: Not on file     Relationship status: Not on file    Intimate partner violence:     Fear of current or ex partner: Not on file     Emotionally abused: Not on file     Physically abused: Not on file     Forced sexual activity: Not on file   Other Topics Concern    Not on file   Social History Narrative    Not on file     History reviewed. No pertinent family history.         OBJECTIVE:           Physical Exam  VITALS:  BP (!) 141/80   Pulse 68   Temp 97.8 °F (36.6 °C) (Oral)   Resp 16   Ht 5' 6\" (1.676 m)   Wt 262 lb 14.4 oz (119.3 kg)   LMP 03/25/2019   SpO2 98%   BMI 42.43  3:02 PM [address] [facility]   Lymphocytes % 24.8  % Final 04/26/2019  3:02 PM [address] [facility]   Monocytes % 9.1  % Final 04/26/2019  3:02 PM [address] [facility]   Eosinophils % 1.3  % Final 04/26/2019  3:02 PM [address] [facility]   Basophils % 0.5  % Final 04/26/2019  3:02 PM [address] [facility]   Neutrophils # 5.7  1.7 - 7.7 K/uL Final 04/26/2019  3:02 PM [address] [facility]   Lymphocytes # 2.2  1.0 - 5.1 K/uL Final 04/26/2019  3:02 PM [address] [facility]   Monocytes # 0.8  0.0 - 1.3 K/uL Final 04/26/2019  3:02 PM [address] [facility]   Eosinophils # 0.1  0.0 - 0.6 K/uL Final 04/26/2019  3:02 PM [address] [facility]   Basophils # 0.0  0.0 - 0.2 K/uL Final 04/26/2019  3:02 PM [address] [facility]   Testing Performed By     [address] Name Director Address Valid Date Range   [address] [facility] [facility] [address] [facility] KELLIE Stern M.D. [place] [address] 08/30/17 0807-Present   Narrative   Performed by: [address] [facility]   Performed at:  [facility]) - [address], [place], [facility]   Phone [phone]   Lab and Collection     CBC auto differential - 4/26/2019   Result History     CBC auto differential on 4/26/2019 4/26/2019  1:02 PM - [facility] Incoming Lab Results From Soft (Epic Adt)     Specimen Information: Cervix        Component Value Ref Range & Units Status Collected Lab   C. trachomatis DNA Negative  Negative Final 04/25/2019  6:30 PM [address] [place]. gonorrhoeae DNA Negative  Negative Final 04/25/2019  6:30 PM [address] [facility]         Impression/Plan:    1. Suspected bilateral TOA    Negative GC, Negative Chlamydia, Negative trichomonas    Repeat CBC stable    Afebrile    Continue 24-48 hours IV antibiotics (cefotetan and doxycycline)    Repeat US scan 5-7 days   2. Obesity   3. Nicotine use   4. PreHTN--stable Blood pressure. Follow up with primary care. Consider discharge 24-48 hours if symptoms continue to improve.

## 2019-04-27 VITALS
HEART RATE: 88 BPM | OXYGEN SATURATION: 98 % | TEMPERATURE: 98 F | SYSTOLIC BLOOD PRESSURE: 113 MMHG | DIASTOLIC BLOOD PRESSURE: 73 MMHG | HEIGHT: 66 IN | WEIGHT: 262.9 LBS | RESPIRATION RATE: 18 BRPM | BODY MASS INDEX: 42.25 KG/M2

## 2019-04-27 PROCEDURE — 2580000003 HC RX 258: Performed by: OBSTETRICS & GYNECOLOGY

## 2019-04-27 PROCEDURE — 6370000000 HC RX 637 (ALT 250 FOR IP): Performed by: OBSTETRICS & GYNECOLOGY

## 2019-04-27 PROCEDURE — 99238 HOSP IP/OBS DSCHRG MGMT 30/<: CPT | Performed by: OBSTETRICS & GYNECOLOGY

## 2019-04-27 PROCEDURE — 6360000002 HC RX W HCPCS: Performed by: OBSTETRICS & GYNECOLOGY

## 2019-04-27 RX ORDER — PSEUDOEPHEDRINE HCL 30 MG
100 TABLET ORAL 2 TIMES DAILY PRN
Qty: 20 CAPSULE | Refills: 0 | Status: SHIPPED | OUTPATIENT
Start: 2019-04-27 | End: 2019-10-03 | Stop reason: ALTCHOICE

## 2019-04-27 RX ORDER — METRONIDAZOLE 500 MG/1
500 TABLET ORAL 2 TIMES DAILY
Qty: 28 TABLET | Refills: 0 | Status: SHIPPED | OUTPATIENT
Start: 2019-04-27 | End: 2019-05-11

## 2019-04-27 RX ORDER — LEVOFLOXACIN 500 MG/1
500 TABLET, FILM COATED ORAL DAILY
Qty: 14 TABLET | Refills: 0 | Status: SHIPPED | OUTPATIENT
Start: 2019-04-27 | End: 2019-05-11

## 2019-04-27 RX ORDER — OXYCODONE HYDROCHLORIDE AND ACETAMINOPHEN 5; 325 MG/1; MG/1
1 TABLET ORAL EVERY 6 HOURS PRN
Qty: 20 TABLET | Refills: 0 | Status: SHIPPED | OUTPATIENT
Start: 2019-04-27 | End: 2019-04-30

## 2019-04-27 RX ORDER — ONDANSETRON 4 MG/1
4 TABLET, ORALLY DISINTEGRATING ORAL EVERY 8 HOURS PRN
Qty: 20 TABLET | Refills: 1 | Status: SHIPPED | OUTPATIENT
Start: 2019-04-27 | End: 2019-06-11

## 2019-04-27 RX ADMIN — CEFOXITIN SODIUM 2 G: 1 POWDER, FOR SOLUTION INTRAVENOUS at 00:09

## 2019-04-27 RX ADMIN — SODIUM CHLORIDE, PRESERVATIVE FREE 10 ML: 5 INJECTION INTRAVENOUS at 10:06

## 2019-04-27 RX ADMIN — KETOROLAC TROMETHAMINE 30 MG: 30 INJECTION, SOLUTION INTRAMUSCULAR at 00:09

## 2019-04-27 RX ADMIN — KETOROLAC TROMETHAMINE 30 MG: 30 INJECTION, SOLUTION INTRAMUSCULAR at 05:53

## 2019-04-27 RX ADMIN — KETOROLAC TROMETHAMINE 30 MG: 30 INJECTION, SOLUTION INTRAMUSCULAR at 11:50

## 2019-04-27 RX ADMIN — ONDANSETRON 4 MG: 2 INJECTION INTRAMUSCULAR; INTRAVENOUS at 01:23

## 2019-04-27 RX ADMIN — OXYCODONE HYDROCHLORIDE AND ACETAMINOPHEN 1 TABLET: 5; 325 TABLET ORAL at 01:23

## 2019-04-27 RX ADMIN — DOCUSATE SODIUM 100 MG: 100 CAPSULE, LIQUID FILLED ORAL at 10:05

## 2019-04-27 RX ADMIN — CEFOXITIN SODIUM 2 G: 1 POWDER, FOR SOLUTION INTRAVENOUS at 11:47

## 2019-04-27 RX ADMIN — CEFOXITIN SODIUM 2 G: 1 POWDER, FOR SOLUTION INTRAVENOUS at 05:53

## 2019-04-27 RX ADMIN — DOXYCYCLINE HYCLATE 100 MG: 100 TABLET, COATED ORAL at 10:05

## 2019-04-27 ASSESSMENT — PAIN SCALES - GENERAL
PAINLEVEL_OUTOF10: 4
PAINLEVEL_OUTOF10: 5
PAINLEVEL_OUTOF10: 4
PAINLEVEL_OUTOF10: 5

## 2019-04-27 NOTE — FLOWSHEET NOTE
Pt was awaiting her ride for d/c.     Pt d/c home. RX all filled here. Instructions given. All questions answered.

## 2019-04-27 NOTE — PROGRESS NOTES
Pt A&O. VSS. Shift assessment complete and charted. Pt rates pain 4/10. Analgesic admin as ordered and requested. Bed locked and in lowest position. Call light and bedside table within reach. Will continue to monitor.

## 2019-04-27 NOTE — PROGRESS NOTES
Department of Gynecology  Attending Progress Note         Hospital day #2   SUBJECTIVE:  Rosendo Fothergill a 33 y.o.  female who presents after transfer from [facility] ED (Tiffanie Chavira). She presents with complaints of left sided pelvic pain that radiated to her pubic region and into her vagina. States pain has been present for a couple months but worsened 2 weeks ago. States she has been complaining about these issues at home but hasn't \"been heard\". States she has been feeling \"heaviness in abdomen\" and like there \"is a tampon or something stuck in her vagina\" with pressure. Pain became acute on 19 when she sat down from putting daughter down for nap. Pain was severe at that time. Felt nauseous and emesis x 5. Denies fevers / chills. Denies discharge / VB. States intercourse has been very very painful. Vaginal exam at [facility] ED was also very painful, especially with cervical palpation.     Pain has significantly improved in the past 12 hours. Has tolerated regular diet. Denies fever, chills, headache, vision changes, chest pain, shortness of breath, nausea, vomiting, diarrhea, constipation, dysuria and hematuria.             LMP 3/25/19, about a month ago. 6 day cycles. Very heavy flow. Pt is sexually active with her . Sharren Cowden for birth control (2017). Denies STI. Admits to very painful intercourse recently. Denies hx of PCOS, Endometriosis or PID. She is T [id number], s/p NSVDs. Denies any abdominal or vaginal surgeries in past. States she feels safe at home. Lives with  and two children.  Pt is a daily tobacco user / vapes.      No Known Allergies  No current facility-administered medications on file prior to encounter.              Current Outpatient Medications on File Prior to Encounter   Medication Sig Dispense Refill    etonogestrel (NEXPLANON) 68 MG implant 68 mg by Subdermal route See Admin Instructions Birth control -          Past Medical History Past Medical History:   Diagnosis Date    Hypertension           Past Surgical History         Past Surgical History:   Procedure Laterality Date    DILATION AND CURETTAGE OF UTERUS   2008    WISDOM TOOTH EXTRACTION   2009         Social History               Socioeconomic History    Marital status:        Spouse name: Not on file    Number of children: Not on file    Years of education: Not on file    Highest education level: Not on file   Occupational History    Not on file   Social Needs    Financial resource strain: Not on file    Food insecurity:       Worry: Not on file       Inability: Not on file    Transportation needs:       Medical: Not on file       Non-medical: Not on file   Tobacco Use    Smoking status: Current Every Day Smoker       Packs/day: 0.50       Years: 3.00       Pack years: 1.50       Types: E-Cigarettes    Smokeless tobacco: Never Used   Substance and Sexual Activity    Alcohol use: Yes       Comment: social drinker    Drug use: No    Sexual activity: Yes       Partners: Male   Lifestyle    Physical activity:       Days per week: Not on file       Minutes per session: Not on file    Stress: Not on file   Relationships    Social connections:       Talks on phone: Not on file       Gets together: Not on file       Attends [employer] service: Not on file       Active member of club or organization: Not on file       Attends meetings of clubs or organizations: Not on file       Relationship status: Not on file    Intimate partner violence:       Fear of current or ex partner: Not on file       Emotionally abused: Not on file       Physically abused: Not on file       Forced sexual activity: Not on file   Other Topics Concern    Not on file   Social History Narrative    Not on file         Family History   History reviewed.  No pertinent family history.              OBJECTIVE:           Physical Exam  Vitals:    04/26/19 2019   BP: (!) 141/80   Pulse:    Resp: 16 complex cyst with septations and mural nodularity,   measuring 3.7 x 3.1 x 3.0 cm.  No Doppler flow within the nodule is detected. There is normal arterial and venous doppler flow.       Free Fluid: No evidence of free fluid.           Impression   Complex, large cysts within both ovaries.       On the right, largest measures 5.8 cm, with multiple thin septations.       On the left, there is a complex cyst with septations and mural nodularity,   measuring up to 3.7 cm.  No flow within the mural nodules are found.       Cysts are nonspecific, and may represent complex physiologic ovarian cysts,   but benign and malignant adnexal cysts remain in the differential.   Tubo-ovarian abscess would also be considered, though felt to be less likely.       Based on the most recent recommendations, a follow-up MRI is recommended,   especially to evaluate the complex cyst with nodularity on the left.    Gynecologic consultation recommended as well.       No sonographic evidence of torsion.          4/26/2019  3:04 PM - [facility] Incoming Lab Results From Soft (Epic Adt)      Component Value Ref Range & Units Status Collected Lab   WBC 8.8  4.0 - 11.0 K/uL Final 04/26/2019  3:02 PM [address] [facility]   RBC 4.51  4.00 - 5.20 M/uL Final 04/26/2019  3:02 PM [address] [facility]   Hemoglobin 12.8  12.0 - 16.0 g/dL Final 04/26/2019  3:02 PM [address] [facility]   Hematocrit 37.8  36.0 - 48.0 % Final 04/26/2019  3:02 PM [facility]   MCV 83.8  80.0 - 100.0 fL Final 04/26/2019  3:02 PM [facility]   MCH 28.4  26.0 - 34.0 pg Final 04/26/2019  3:02 PM [address] [facility]   MCHC 33.9  31.0 - 36.0 g/dL Final 04/26/2019  3:02 PM [address] [facility]   RDW 14.0  12.4 - 15.4 % Final 04/26/2019  3:02 PM [address] [facility]   Platelets 187  432 - 450 K/uL Final 04/26/2019  3:02 PM [address] [facility]   MPV 7.2  5.0 - 10.5 fL Final 04/26/2019  3:02 PM [address] [place] [address]

## 2019-04-27 NOTE — PLAN OF CARE
Pt a/o, rates pain appropriately using 0-10 pain scale; pt calls out as needed for pain intervention; will continue to monitor and administer intervention as ordered and requested.

## 2019-04-28 LAB — URINE CULTURE, ROUTINE: NORMAL

## 2019-05-10 ENCOUNTER — OFFICE VISIT (OUTPATIENT)
Dept: OBGYN CLINIC | Age: 29
End: 2019-05-10
Payer: COMMERCIAL

## 2019-05-10 VITALS
SYSTOLIC BLOOD PRESSURE: 122 MMHG | DIASTOLIC BLOOD PRESSURE: 86 MMHG | HEART RATE: 80 BPM | HEIGHT: 65 IN | BODY MASS INDEX: 42.72 KG/M2 | WEIGHT: 256.4 LBS | TEMPERATURE: 98.2 F

## 2019-05-10 DIAGNOSIS — R10.2 PELVIC PAIN: Primary | ICD-10-CM

## 2019-05-10 DIAGNOSIS — N83.202 BILATERAL OVARIAN CYSTS: ICD-10-CM

## 2019-05-10 DIAGNOSIS — Z72.0 TOBACCO USER: ICD-10-CM

## 2019-05-10 DIAGNOSIS — N83.201 BILATERAL OVARIAN CYSTS: ICD-10-CM

## 2019-05-10 DIAGNOSIS — E66.01 MORBID OBESITY WITH BMI OF 40.0-44.9, ADULT (HCC): ICD-10-CM

## 2019-05-10 DIAGNOSIS — N93.9 ABNORMAL UTERINE BLEEDING (AUB): ICD-10-CM

## 2019-05-10 DIAGNOSIS — R03.0 PRE-HYPERTENSION: ICD-10-CM

## 2019-05-10 LAB
BASOPHILS ABSOLUTE: 0.1 K/UL (ref 0–0.2)
BASOPHILS RELATIVE PERCENT: 0.6 %
CEA: 1.7 NG/ML (ref 0–5)
EOSINOPHILS ABSOLUTE: 0.1 K/UL (ref 0–0.6)
EOSINOPHILS RELATIVE PERCENT: 1.1 %
HCT VFR BLD CALC: 43.1 % (ref 36–48)
HEMOGLOBIN: 14.2 G/DL (ref 12–16)
LYMPHOCYTES ABSOLUTE: 1.8 K/UL (ref 1–5.1)
LYMPHOCYTES RELATIVE PERCENT: 19.9 %
MCH RBC QN AUTO: 27.8 PG (ref 26–34)
MCHC RBC AUTO-ENTMCNC: 33 G/DL (ref 31–36)
MCV RBC AUTO: 84.5 FL (ref 80–100)
MONOCYTES ABSOLUTE: 0.7 K/UL (ref 0–1.3)
MONOCYTES RELATIVE PERCENT: 7.4 %
NEUTROPHILS ABSOLUTE: 6.6 K/UL (ref 1.7–7.7)
NEUTROPHILS RELATIVE PERCENT: 71 %
PDW BLD-RTO: 14 % (ref 12.4–15.4)
PLATELET # BLD: 286 K/UL (ref 135–450)
PMV BLD AUTO: 7.9 FL (ref 5–10.5)
RBC # BLD: 5.1 M/UL (ref 4–5.2)
WBC # BLD: 9.3 K/UL (ref 4–11)

## 2019-05-10 PROCEDURE — 99212 OFFICE O/P EST SF 10 MIN: CPT | Performed by: OBSTETRICS & GYNECOLOGY

## 2019-05-10 PROCEDURE — 4004F PT TOBACCO SCREEN RCVD TLK: CPT | Performed by: OBSTETRICS & GYNECOLOGY

## 2019-05-10 PROCEDURE — 76856 US EXAM PELVIC COMPLETE: CPT | Performed by: OBSTETRICS & GYNECOLOGY

## 2019-05-10 PROCEDURE — G8417 CALC BMI ABV UP PARAM F/U: HCPCS | Performed by: OBSTETRICS & GYNECOLOGY

## 2019-05-10 PROCEDURE — 36415 COLL VENOUS BLD VENIPUNCTURE: CPT | Performed by: OBSTETRICS & GYNECOLOGY

## 2019-05-10 PROCEDURE — 1111F DSCHRG MED/CURRENT MED MERGE: CPT | Performed by: OBSTETRICS & GYNECOLOGY

## 2019-05-10 PROCEDURE — G8427 DOCREV CUR MEDS BY ELIG CLIN: HCPCS | Performed by: OBSTETRICS & GYNECOLOGY

## 2019-05-10 RX ORDER — ESTRADIOL 0.1 MG/G
1 CREAM VAGINAL DAILY
Qty: 1 TUBE | Refills: 3 | Status: SHIPPED | OUTPATIENT
Start: 2019-05-10 | End: 2019-05-10 | Stop reason: CLARIF

## 2019-05-10 NOTE — LETTER
[facility] OB/GYN SURGERY SCHEDULING WORKSHEET    Patient Name: Gustavo Vigil  Patient : 1990  Patient Sex: female  Patient SSN:   Patient Address: [address] [place] [address]  Patient Home Phone: [phone] (home)  Cell:   Telephone Information:   Mobile [phone]     Patient Insurance: Payor: Ye Fuentes / Plan: Campos Hutton / Product Type: *No Product type* /   Insurance I.D. #: [id number]  Authorization #: [id number]  PCP: No primary care provider on file. Patient Type:  Outpatient  Anesthesia Type:  general  Surgeon:  Dr. Rhonda Singh  Procedure:  Diagnostic laparoscopy, possible bilateral ovarian cystectomy, possible bilateral oophorectomy, Hysteroscopy, Dilation and Curettage  CPT Code(s): 34450, 00223, 58909, 81826  PRE OP Diagnosis:  pelvic pain, ovarian cysts, abnormal uterine bleeding  ICD-10 Code(s): R10.2, N83.201, N83.202, N93.9    Surgery Date:  2019  Surgery Time:  2:00 PM   OR Time Needed:  1.5 hours  Surgery Location:  [facility]    Allergies: No Known Allergies  Latex Sensitive? no    Who will do History and Physical?  PCP  Cardiac Clearance Needed?  no  Does patient have pacemaker or implanted cardiac defibrillator? no  Special Equipment:  laparoscope     Name: Sydney Yates 5/10/2019  FAX Number: [phone]                      PRE-SURGICAL PHYSICIAN ORDERS    Height:   Ht Readings from Last 1 Encounters:   05/10/19 5' 4.75\" (1.645 m)     Weight:   Wt Readings from Last 1 Encounters:   05/10/19 256 lb 6.4 oz (116.3 kg)       Pre-surgery testing orders:     *Pre-surgical Anesthesia Orders per Anesthesiologist  CBC and BMP urine HCG  *Knee Antithrombic Compression Wraps    Past Medical History:   1. Cold/Chronic Cough/Tuberculosis  No  2. Bronchitis/COPD  No  3. Asthma/SOB  No  4. Rheumatic Fever/Heart Murmur  No  5. High Blood Pressure/Low Blood Pressure  No  6.  Swelling of Feet/Fluid in Lungs  No IN ACCORDANCE WITH OUR FORMULARY SYSTEM, A GENERIC EQUIVALENT DRUG MAY BE DISPENSED AND ADMINISTERED UNLESS D. A. W. IS WRITTEN WITH THE MEDICATION ORDER  PRE-SURGICAL PHYSICIAN ORDERS:  GYNECOLOGY SURGERY    Patient Name __Melany Espinoza_____    _1990__    Surgical Procedure__Diagnostic laparoscopy, possible bilateral ovarian cystectomy, possible bilateral oophorectomy, Hysteroscopy, Dilation and Curettage_______________________________________      Date of Surgery___19_________             ? Admit as Inpatient    X Outpatient    Height_5'4.75\"__ Weight_256 LB___Allergies___NKDA____________________________    Pre-surgery Testing Orders:  ? Pre-surgical Anesthesia Orders Per Anesthesiologist ? Type & Screen ? RH ABO ? CBC ? Chem 7   ? Serum HCG quantitative ? Serum HCG qualitative ? CXR _____ Reason ? EKG _____reason                           ? Urine Pregnancy on Day of Surgery for all local anesthesia patients ? Other:     Preop Antibiotic Prophylaxis/Hysterectomy/Lap assisted Hysterectomy/Vaginal Hysterectomy-Day of Surgery     Cefazolin IVPB per weight base protocol  ? Cefazolin 2 grams if <119.9kg  ? Cefazolin 3 grams if ?120kg (?264 lbs.)    ALTERNATIVE:     (Consider for PCN/Cephalosporin allergic pts)                                                                                                                      Metronidazole 500 mg IVPB x 1 and Levofloxacin 500 mg IVPB x1      Metronidazole 500 mg IVPB x 1 and Gentamicin 1.5 mg/kg IVPB x 1                                                                                                                                    Pre-op Antibiotics Prophylaxis: Pubovaginal Sling-Day of Surgery     Cefazolin IVPB per weight base protocol  ? Cefazolin 2 grams if <119.9kg  ?  Cefazolin 3 grams if ?120kg (?264 lbs.)      Ampicillin/Sulbactam 3g IVPB       ALTERNATIVE:    Levofloxacin 500 mg IVPB ALTERNATIVE: (Consider for PCN/Cephalosporin allergic pts)        Clindamycin 900 mg IVPB + Gentamicin 1.5 mg/kg IVPB x 1    Metronidazole 500 mg IVPB x1 and Gentamicin 1.5 mg/kg IVPB x1   ADDITIONAL MEDICATIONS:    DVT PREVENTION:       ? Knee Antithrombic compression wraps    Additional Orders: _____________________________________________________________________ ________________________________________________________________________________                Signature _____________________________________________Date _____________________    Please fax at time of booking the case to the Scheduling office at [phone] [facility] at [phone]                                             Updated: 3/2015          PRE-OPERATIVE / PROCEDURE HISTORY & PHYSICAL  FAX TO: [phone]    Patient Name:___Melany Espinoza___________     :___1990_______    Date:___________________     Surgeon:_Dr. Juma Fraser____________  CHIEF COMPLAINT: _pelvic pain, ovarian cysts, abnormal uterine bleeding___  PLANNED SURGICAL PROCEDURE:__Diagnostic laparoscopy, possible bilateral ovarian cystectomy, possible bilateral oophorectomy, Hysteroscopy, Dilation and Curettage__________      Medical History:   ? Hypertension      ? Hyperlipidemia     ? Diabetes Mellitus    ? Coronary Artery Disease  ______________________________________________________________________  ______________________________________________________________________    Surgical History:  ______________________________________________________________________  ______________________________________________________________________  Social Hx:        ?   Smoker ppd_____       ? Alcohol, drinks/day_____  Family Hx:       ? Anesthesia problems      ? Bleeding problems     ? Clotting problems                           ?    Other ___________________________________________________    ROS:  ______________________________________________________________________ ______________________________________________________________________    Medications: (Name/Dose/Frequency):  ______________________________________________________________________  ______________________________________________________________________  ______________________________________________________________________    Allergies:  ______________________________________________________________________  Physical Exam:    Vital Signs:   BP______P______T______Resp_____Ht______Wt______BMI_______    General:  HEENT:   Neck:                                                                                             Lymph:  CV:                                                                                                Skin:  Pulmonary:                                                                                   GYN:  Abdomen:                                                                                     Extremities:  Neuro:    Patient Name: __Melany Aranall______________ : ______1990______      Impression:                    Plan:                      Perioperative Beta-Blocker therapy should be initiated in at-risk patients undergoing major abdominal or vascular procedures, joint replacement, or major chest procedures. At-risk patients are those with established CAD, DM, or have two or more risk factors  age >71, smoker, HTN or hyperlipidemia. Recommendations:  ? If on Beta-Blocker already, continue current drug and titrate to target  ? If not currently on Beta-Blocker, recommend Atenolol or Metoprolol and titrate to target (target systolic BP <043, Pulse <60)  ? Please begin therapy prior to surgery and titrate to goal  ? Beta-Blocker should be continued for at least 30 days post-operatively as tolerated  ? Prescription and therapy instituted  ?  The patient would benefit from Beta-Blocker therapy, but their use is contraindicated (i.e. reactive airway disease exacerbated by Beta-Blockers, 2nd or 3rd degree heart block, severe aortic stenosis, other______________________________)  ?  The patient is to continue their current Beta-Blocker therapy        Physician Signature: ________________________________ Date: ___________

## 2019-05-10 NOTE — PROGRESS NOTES
Blood draw from R Antecubital x 1 attempt without difficulty. 2 SST, 1 PST, 1 LAV tubes drawn. Patient tolerated well. Patricia Figueroa     Last PAP was normal; June/2014.    Abnormal pap history? no  Last HPV screen: unknown  Patient has never had a mammogram.  Last Dexa Scan: N/A    Contraceptive method: Nexplanon  No prior colonoscopy    Reviewed with patient recommendation to have a PCP and provided patient with current [facility] PCP list.

## 2019-05-10 NOTE — PROGRESS NOTES
[facility]   [facility]    CC:   Chief Complaint   Patient presents with    ED Follow-up       HPI:  33 y.o. who presents to [facility] for FU after hospitalization for suspected BL TOA. Today pt admits that despite 2 weeks of antibiotic therapy (flagyl/Levoquin), her pain remains \"unchanged for the most part\". She admits to taking abx as prescribed. Denies any vaginal discharge / fevers / chills. Admits to occasional back pain (radiates from pelvis). Denies CP / SOB/. Admits to attempting intercourse once over past 2 weeks, but \"it didn't work out\" because of the pain. Patient's last menstrual period was 04/29/2019 (exact date). Did not notice a change in her usual menses. Denies change in pain before  / after / during. Review of Systems - The following ROS was otherwise negative, except as noted in the HPI: constitutional, respiratory, cardiovascular, gastrointestinal, genitourinary    Objective:  /86 (Site: Right Upper Arm, Position: Sitting, Cuff Size: Large Adult)   Pulse 80   Temp 98.2 °F (36.8 °C) (Oral)   Ht 5' 4.75\" (1.645 m)   Wt 256 lb 6.4 oz (116.3 kg)   LMP 04/29/2019 (Exact Date)   BMI 43.00 kg/m²   General: Alert, well appearing, some distress   Resp effort within normal limits   Abdomen: Soft, nontender, nondistended, obese    Pelvic: Deferred   Note: US probe painful with deep vaginal / anterior pressure. Similar to pain on admission to hospital.   Skin: No visible lesions / rashes / concerning nevus   Extremities: No redness or tenderness, neg Roosevelt's sign  Osteopathic: no TART changes    Imaging:  PELVIC ULTRASOUND with DOPPLER INTERROGATION   NON OB    DATE: 5/10/19    PHYSICIAN: ULISES Martins.     SONOGRAPHER: Erin Avitia RDMS    INDICATION: Pelvic pain, ER follow up    COMPARISON: 4/25/19    TYPE OF SCAN: vaginal, abdominal    FINDINGS:    The cul de sac is normal. No free fluid is appreciated. The cervix is normal and not enlarged.   Nabothian cyst/s is not noted within the uterine cervix. The uterus measures 10.10cm x 4.25cm. The uterus is anteverted. The endometrium measures 15.99 mm. The endometerium appears heterogeneous and irregular in contour. The myometrium is homogeneous in appearance. Suspected polypoid tissue. The right ovary is present. The right ovary measures 6.41cm x 6.73cm x 3.68cm. Ovary findings:  Two cysts are seen. The larger cyst contains a daughter cyst and measures 4.96 x 3.62 x 5.58cm. The right adnexa is normal.    The left ovary is present. The left ovary measures 4.14cm x 4.54cm x 3.44cm. Ovary findings: cyst measuring 2.17 x 2.35 x 2.22cm. The cyst appears simple with a papillary projection seen measuring 0.95cm. The left adnexa is normal.    IMPRESSION: Heterogenous irregular endometrium. Suspected polypoid tissue. Bilateral ovarian cysts. Assessment/Plan  1. Pelvic pain - Acute on Chronic   - s/p admission for suspected BL TOA (based on adnexal masses and cervical motion tenderness). s/p 2 weeks of PO abx (Levoquin / Flagyl)   - admits to persistent pelvic pain despite therapy   - concerned underlying etiology of pain due to ovarian cysts without infectious etiology. No evidence of torsion at this time. - Differential diagnosis of persistent pelvic pain includes 1) Intrauterine Polyps, 2) Adenomyosis, 3) Endometriosis, 4) Intrapelvic malignancy, 5) NOS     - Discussed therapy options including expectant management with serial ultrasounds, continued abx therapy or surgical evaluation.   - Pt would like to proceed with Hysteroscopy, D and C, Diagnostic Laparoscopy with BL cystectomy, possible LSO, RSO or BSO (with or without frozen section), possible adhesiolysis, possible ablation of endometriosis. 2. Bilateral ovarian cysts  - When compared to previous US, Right ovary remains similar in size and consistency. Left ovary has increased in size marginally with persistent papillary projection.    - Due to complex cysts and persistent papillation, tumor markers and pelvic MRI pending    -    - AFP Tumor Marker   - HCG, Tumor Marker   - CEA   - CBC Auto Differential   - MRI PELVIS W WO CONTRAST; Future  - if malignancy is suspected will refer to Asad Rouse prior to surgical evaluation     3. Abnormal uterine bleeding (AUB)  - suspect AUB related to intrauterine polyp  - due to risk factors (obesity, thickened stripe, AUB, pelvic pain) offered in-office EMBx, but elected for D and C     4. Morbid obesity with BMI of 40.0-44.9, adult ([address]    5. Pre-hypertension    6. Tobacco user      Follow Up   Return for Pre Op.      Tracee Tomlinson DO

## 2019-05-11 PROBLEM — N70.93 TOA (TUBO-OVARIAN ABSCESS): Status: RESOLVED | Noted: 2019-04-26 | Resolved: 2019-05-11

## 2019-05-11 PROBLEM — N83.201 BILATERAL OVARIAN CYSTS: Status: ACTIVE | Noted: 2019-05-11

## 2019-05-11 PROBLEM — N83.202 BILATERAL OVARIAN CYSTS: Status: ACTIVE | Noted: 2019-05-11

## 2019-05-11 LAB — CA 125: 12.9 U/ML (ref 0–35)

## 2019-05-12 LAB — HCG TUMOR MARKER: <1 IU/L (ref 0–5)

## 2019-05-14 ENCOUNTER — OFFICE VISIT (OUTPATIENT)
Dept: FAMILY MEDICINE CLINIC | Age: 29
End: 2019-05-14
Payer: COMMERCIAL

## 2019-05-14 VITALS
BODY MASS INDEX: 42.04 KG/M2 | HEIGHT: 66 IN | RESPIRATION RATE: 16 BRPM | OXYGEN SATURATION: 97 % | SYSTOLIC BLOOD PRESSURE: 132 MMHG | DIASTOLIC BLOOD PRESSURE: 88 MMHG | WEIGHT: 261.6 LBS | HEART RATE: 78 BPM

## 2019-05-14 DIAGNOSIS — R10.2 PELVIC PAIN: ICD-10-CM

## 2019-05-14 DIAGNOSIS — Z01.818 PRE-OP EXAM: Primary | ICD-10-CM

## 2019-05-14 LAB — AFP: 1.8 UG/L

## 2019-05-14 PROCEDURE — 99242 OFF/OP CONSLTJ NEW/EST SF 20: CPT | Performed by: NURSE PRACTITIONER

## 2019-05-14 PROCEDURE — G8417 CALC BMI ABV UP PARAM F/U: HCPCS | Performed by: NURSE PRACTITIONER

## 2019-05-14 PROCEDURE — G8427 DOCREV CUR MEDS BY ELIG CLIN: HCPCS | Performed by: NURSE PRACTITIONER

## 2019-05-14 ASSESSMENT — PATIENT HEALTH QUESTIONNAIRE - PHQ9
SUM OF ALL RESPONSES TO PHQ QUESTIONS 1-9: 2
SUM OF ALL RESPONSES TO PHQ QUESTIONS 1-9: 2
SUM OF ALL RESPONSES TO PHQ9 QUESTIONS 1 & 2: 2
2. FEELING DOWN, DEPRESSED OR HOPELESS: 1
1. LITTLE INTEREST OR PLEASURE IN DOING THINGS: 1

## 2019-05-14 NOTE — PROGRESS NOTES
[facility] & [facility]  [phone]  Fax: [phone]   Pre-operative History and Physical    Beatriz Henson is a 33 y.o. female who is referred by Dr. Rhonda Singh for a preoperative physical examination and medical clearance for surgery. She is scheduled to have a hysteroscopy, D and C, diagnostic laparoscopy with BL cystectomy at [facility] on (To be Determined). Patient advised it needs to be in the next 30 days or exam would need to be repeated    DIAGNOSIS:  Pelvic pain. History Obtained From:  patient    HISTORY OF PRESENT ILLNESS:    The patient is a 33 y.o. female with significant past medical history of pelvic pain who presents for history and physical.       Patient's past medical history, surgical history, family history, medications,  and allergies  were all reviewed and updated as appropriate today. Past Medical History:   Diagnosis Date    Hypertension- does not need medications      Past Surgical History:   Procedure Laterality Date    DILATION AND CURETTAGE      DILATION AND CURETTAGE OF UTERUS  2008    WISDOM TOOTH EXTRACTION  2009     Current Outpatient Medications   Medication Sig Dispense Refill    docusate sodium (COLACE, DULCOLAX) 100 MG CAPS Take 100 mg by mouth 2 times daily as needed for Constipation 20 capsule 0    ondansetron (ZOFRAN-ODT) 4 MG disintegrating tablet Take 1 tablet by mouth every 8 hours as needed for Nausea 20 tablet 1    etonogestrel (NEXPLANON) 68 MG implant 68 mg by Subdermal route See Admin Instructions Birth control -       No current facility-administered medications for this visit. Allergies:  Patient has no known allergies. History of allergic reaction to anesthesia:  No     Social History     Tobacco Use   Smoking Status Current Every Day Smoker    Packs/day: 0.25    Years: 3.00    Pack years: 0.75    Types: E-Cigarettes   Smokeless Tobacco Never Used     The patient states she drinks 0 per week.     Family alert, oriented to name, place and time. Cranial nerves II-XII are grossly intact. Motor is 5 out of 5 bilaterally. ASSESSMENT AND PLAN:    1. Patient is a 33 y.o. female with above specified procedure planned on TBD with Dr. Justin Martinez at [facility]. 2. Stop NSAIDs/ASA medications 7-10 days prior to procedure.   3.Patient is cleared for surgery    Wilda Betancourt, [address]  [phone]

## 2019-05-21 ENCOUNTER — HOSPITAL ENCOUNTER (OUTPATIENT)
Dept: MRI IMAGING | Age: 29
Discharge: HOME | End: 2019-05-21
Payer: COMMERCIAL

## 2019-05-21 DIAGNOSIS — N83.201 BILATERAL OVARIAN CYSTS: ICD-10-CM

## 2019-05-21 DIAGNOSIS — N83.202 BILATERAL OVARIAN CYSTS: ICD-10-CM

## 2019-05-21 PROCEDURE — A9579 GAD-BASE MR CONTRAST NOS,1ML: HCPCS | Performed by: OBSTETRICS & GYNECOLOGY

## 2019-05-21 PROCEDURE — 72197 MRI PELVIS W/O & W/DYE: CPT

## 2019-05-21 PROCEDURE — 6360000004 HC RX CONTRAST MEDICATION: Performed by: OBSTETRICS & GYNECOLOGY

## 2019-05-21 RX ADMIN — GADOTERIDOL 23 ML: 279.3 INJECTION, SOLUTION INTRAVENOUS at 15:36

## 2019-06-04 ENCOUNTER — OFFICE VISIT (OUTPATIENT)
Dept: FAMILY MEDICINE CLINIC | Age: 29
End: 2019-06-04
Payer: COMMERCIAL

## 2019-06-04 VITALS
HEART RATE: 95 BPM | SYSTOLIC BLOOD PRESSURE: 126 MMHG | OXYGEN SATURATION: 97 % | RESPIRATION RATE: 16 BRPM | BODY MASS INDEX: 42.16 KG/M2 | WEIGHT: 261.2 LBS | DIASTOLIC BLOOD PRESSURE: 86 MMHG

## 2019-06-04 DIAGNOSIS — Z01.818 PRE-OP EXAM: Primary | ICD-10-CM

## 2019-06-04 DIAGNOSIS — R10.2 PELVIC PAIN: ICD-10-CM

## 2019-06-04 PROCEDURE — G8417 CALC BMI ABV UP PARAM F/U: HCPCS | Performed by: NURSE PRACTITIONER

## 2019-06-04 PROCEDURE — G8427 DOCREV CUR MEDS BY ELIG CLIN: HCPCS | Performed by: NURSE PRACTITIONER

## 2019-06-04 PROCEDURE — 99242 OFF/OP CONSLTJ NEW/EST SF 20: CPT | Performed by: NURSE PRACTITIONER

## 2019-06-04 NOTE — PROGRESS NOTES
[facility]  [phone]  Fax: [phone]   Pre-operative History and Physical    Bony Alcantara is a 33 y.o. female who is referred by Dr. Lima Sue for a preoperative physical examination and medical clearance for surgery. She is scheduled to have a diagnostic laparoscopy, possible bilateral ovarian cystectomy, possible bilateral oophorectomy, hysteroscopy, dilation and curettage at [facility] on 6-17-19. DIAGNOSIS:  Pelvic pain, ovarian cysts, abnormal uterine bleeding. History Obtained From:  patient    HISTORY OF PRESENT ILLNESS:    The patient is a 33 y.o. female with significant past medical history of pelvic pain who presents pre-op       Past Medical History:   Diagnosis Date    Hypertension      Past Surgical History:   Procedure Laterality Date    DILATION AND CURETTAGE      DILATION AND CURETTAGE OF UTERUS  2008    WISDOM TOOTH EXTRACTION  2009     Current Outpatient Medications   Medication Sig Dispense Refill    docusate sodium (COLACE, DULCOLAX) 100 MG CAPS Take 100 mg by mouth 2 times daily as needed for Constipation 20 capsule 0    ondansetron (ZOFRAN-ODT) 4 MG disintegrating tablet Take 1 tablet by mouth every 8 hours as needed for Nausea 20 tablet 1    etonogestrel (NEXPLANON) 68 MG implant 68 mg by Subdermal route See Admin Instructions Birth control -       No current facility-administered medications for this visit. Allergies:  Patient has no known allergies. History of allergic reaction to anesthesia:  No     Social History     Tobacco Use   Smoking Status Current Every Day Smoker    Packs/day: 0.25    Years: 3.00    Pack years: 0.75    Types: E-Cigarettes   Smokeless Tobacco Never Used     The patient states she drinks 0 per week.     Family History   Problem Relation Age of Onset    Hypertension Maternal Grandmother     Breast Cancer Maternal Grandmother         27    Coronary Art Dis Maternal Grandmother     Emphysema Maternal Grandmother     Diabetes Father     Hypertension Father     Hypertension Mother     Other Mother         endometriosis, fibroids    Breast Cancer Paternal Grandmother        REVIEW OF SYSTEMS:    CONSTITUTIONAL:  negative  EYES:  negative  HEENT:  Sore throat for couple of day  RESPIRATORY:  negative  CARDIOVASCULAR:  negative  GASTROINTESTINAL:  negative  GENITOURINARY:  positive for pelvic pain  INTEGUMENT/BREAST:  negative  HEMATOLOGIC/LYMPHATIC:  Bruises easily  ALLERGIC/IMMUNOLOGIC:  negative  ENDOCRINE:  negative  MUSCULOSKELETAL:  negative  NEUROLOGICAL:  negative    PHYSICAL EXAM:      /86   Pulse 95   Resp 16   Wt 261 lb 3.2 oz (118.5 kg)   LMP 05/29/2019   SpO2 97%   BMI 42.16 kg/m²     CONSTITUTIONAL:  awake, alert, cooperative, no apparent distress, and appears stated age    Eyes:  Lids and lashes normal, pupils equal, round and reactive to light, extra ocular muscles intact, sclera clear, conjunctiva normal    Head/ENT:  Normocephalic, without obvious abnormality, atramatic, sinuses nontender on palpation, external ears without lesions, oral pharynx with moist mucus membranes, tonsils without erythema or exudates, gums normal and good dentition. Neck:  Supple, symmetrical, trachea midline, no adenopathy, thyroid symmetric, not enlarged and no tenderness, skin normal, No carotid bruit    Heart:  Normal apical impulse, regular rate and rhythm, normal S1 and S2, no S3 or S4, and no murmur noted    Lungs:  No increased work of breathing, good air exchange, clear to auscultation bilaterally, no crackles or wheezing    Abdomen:  No scars, normal bowel sounds, soft, non-distended, non-tender, no masses palpated, no hepatosplenomegally    Extremities:  No clubbing, cyanosis, or edema    NEUROLOGIC:  Awake, alert, oriented to name, place and time. .            ASSESSMENT AND PLAN:    1.   Patient is a 33 y.o. female with above specified procedure planned on 6-17-19 with Dr. Maegan Miller at [facility]. 2. Stop NSAIDs/ASA medications 7-10 days prior to procedure.   3.Patient is cleared for surgery    Mariana Bell CNP    [address]  [phone]

## 2019-06-11 ENCOUNTER — OFFICE VISIT (OUTPATIENT)
Dept: OBGYN CLINIC | Age: 29
End: 2019-06-11

## 2019-06-11 VITALS
DIASTOLIC BLOOD PRESSURE: 78 MMHG | WEIGHT: 258 LBS | TEMPERATURE: 98.6 F | HEART RATE: 91 BPM | SYSTOLIC BLOOD PRESSURE: 124 MMHG | BODY MASS INDEX: 43.27 KG/M2

## 2019-06-11 DIAGNOSIS — R10.2 PELVIC PAIN: Primary | ICD-10-CM

## 2019-06-11 DIAGNOSIS — Z72.0 TOBACCO USER: ICD-10-CM

## 2019-06-11 DIAGNOSIS — N83.202 BILATERAL OVARIAN CYSTS: ICD-10-CM

## 2019-06-11 DIAGNOSIS — N93.9 ABNORMAL UTERINE BLEEDING (AUB): ICD-10-CM

## 2019-06-11 DIAGNOSIS — E66.01 MORBID OBESITY WITH BMI OF 40.0-44.9, ADULT (HCC): ICD-10-CM

## 2019-06-11 DIAGNOSIS — R11.0 NAUSEA: ICD-10-CM

## 2019-06-11 DIAGNOSIS — N83.201 BILATERAL OVARIAN CYSTS: ICD-10-CM

## 2019-06-11 DIAGNOSIS — N90.89 VULVAR SKIN TAG: ICD-10-CM

## 2019-06-11 PROCEDURE — 99024 POSTOP FOLLOW-UP VISIT: CPT | Performed by: OBSTETRICS & GYNECOLOGY

## 2019-06-11 RX ORDER — ONDANSETRON 4 MG/1
4 TABLET, ORALLY DISINTEGRATING ORAL EVERY 8 HOURS PRN
Qty: 20 TABLET | Refills: 3 | Status: SHIPPED | OUTPATIENT
Start: 2019-06-11 | End: 2020-01-21

## 2019-06-11 NOTE — PROGRESS NOTES
[facility]   Return Gyn Office Visit    CC:   Chief Complaint   Patient presents with    Pre-op Exam       HPI:  33 y.o. who presents to [facility] for pre-op visit. Scheduled for Hysteroscopy, Dilation and Curettage with Dx Lap and BL Cystectomy, possible oophorectomy or other indicated procedures. Would also like skin tag removed at time of surgery. Pt states she has been doing fair. Pain is still present in similar nature as last visit. Denies fever, chills. Admits to occasional nausea and warns she has a history of being very nauseated after anesthesia, will add zofran for home. Pt also complains of a skin tag on her labia that causes discomfort (pulls and gets caught on clothing). Patient's last menstrual period was 05/29/2019. Pt was initially seen in ED and treated for BL TOA. Went home after IV Abx inpatient with 2 weeks of Flagyl and Levoquin. Repeat US showed no improvement in ovarian cyst size nor pelvic pain. Review of Systems - The following ROS was otherwise negative, except as noted in the HPI: constitutional, respiratory, cardiovascular, gastrointestinal, genitourinary    Objective:  /78 (Site: Right Upper Arm, Position: Sitting, Cuff Size: Large Adult)   Pulse 91   Temp 98.6 °F (37 °C) (Oral)   Wt 258 lb (117 kg)   LMP 05/29/2019   Breastfeeding? No   BMI 43.27 kg/m²   General: Alert, well appearing, no acute distress    Resp effort within normal limits   Abdomen: Soft, nontender, nondistended   Pelvic: Deferred   Skin: No visible lesions / rashes / concerning nevus   Extremities: No redness or tenderness, neg Roosevelt's sign  Osteopathic: no TART changes    Images:  PELVIC ULTRASOUND with DOPPLER INTERROGATION   NON OB     DATE: 5/10/19     PHYSICIAN: ULISES Gonzalez      SONOGRAPHER: Derek Smallwood RDMS     INDICATION: Pelvic pain, ER follow up     COMPARISON: 4/25/19     TYPE OF SCAN: vaginal, abdominal     FINDINGS:    The cul de sac is normal. No free fluid is appreciated. The cervix is normal and not enlarged. Nabothian cyst/s is not noted within the uterine cervix. The uterus measures 10.10cm x 4.25cm. The uterus is anteverted. The endometrium measures 15.99 mm. The endometerium appears heterogeneous and irregular in contour. The myometrium is homogeneous in appearance. Suspected polypoid tissue.      The right ovary is present. The right ovary measures 6.41cm x 6.73cm x 3.68cm. Ovary findings:  Two cysts are seen. The larger cyst contains a daughter cyst and measures 4.96 x 3.62 x 5.58cm. The right adnexa is normal.     The left ovary is present. The left ovary measures 4.14cm x 4.54cm x 3.44cm. Ovary findings: cyst measuring 2.17 x 2.35 x 2.22cm. The cyst appears simple with a papillary projection seen measuring 0.95cm. The left adnexa is normal.     IMPRESSION: Heterogenous irregular endometrium. Suspected polypoid tissue. Bilateral ovarian cysts.      Narrative   EXAMINATION:   MRI OF THE PELVIS WITHOUT AND WITH CONTRAST, 5/21/2019 2:44 pm       TECHNIQUE:   Multiplanar multisequence MRI of the pelvis was performed without and with   the administration of intravenous contrast.       COMPARISON:   04/25/2019 pelvic ultrasound.  04/25/2019 CT abdomen/pelvis.       HISTORY:   ORDERING SYSTEM PROVIDED HISTORY: Bilateral ovarian cysts   TECHNOLOGIST PROVIDED HISTORY:   Ordering Physician Provided Reason for Exam: Ovarian cysts seen on other   imaging, Pelvic pain   Acuity: Acute   Type of Exam: Initial       Bilateral complex ovarian cystic lesions seen on recent prior CT and   ultrasound.       FINDINGS:   The uterus measures approximately 8 x 4.2 x 5.8 cm in size.  No evidence of   focal myometrial lesion.  The junctional zone is within normal limits in   thickness.  The endometrial stripe is within normal limits measuring   approximately 4 mm in thickness.       The left ovary measures approximately 2.6 x 2.6 x 3.2 cm in size.  There are   normal follicles in the left ovary.  In addition, there is a hemorrhagic cyst   in the left ovary measuring 17 mm in diameter with layering fluid.  This   demonstrates no evidence of enhancement on postcontrast images.       There is a multi-cystic lesion in the dependent posterior pelvis likely   arising from the right adnexa likely representing the right ovary measuring   8.6 x 4.6 x 6.7 cm in size.  There are multiple nonenhancing cystic lesions   within the suspected right ovary measuring up to 5.6 x 4 x 4.6 cm and 4.1 x   5.2 x 4.8 cm in size.  There is another cystic lesion measuring 3 x 3.4 cm in   size.  There is fluid in the right adnexa, left adnexa and dependent pelvis. There is no evidence of abnormal enhancement.  The bladder is unremarkable. No evidence of pelvic lymphadenopathy.       The visualized osseous structures demonstrate no acute abnormality.  The   visualized bowel is unremarkable.           Impression   Large multi-cystic right ovary measuring 8.6 x 4.6 x 6.7 cm in size with   multiple nonenhancing cystic lesions measuring up to 5.6 cm in size.  Given   that patient is premenopausal and lack of an enhancing/solid component on   MRI, would recommend a follow-up ultrasound in 6 weeks to evaluate for   change/resolution.       Free fluid in the bilateral adnexa and dependent pelvis likely related to the   multicystic right ovary.       Unremarkable appearance of the left ovary other than a small hemorrhagic cyst   measuring 17 mm in diameter.  This may represent a hemorrhagic physiologic   follicle.       Unremarkable appearance of the uterus and endometrial stripe. Pertinent Labs:       Assessment/Plan  1. Pelvic pain - Acute on Chronic   - s/p admission for suspected BL TOA (based on adnexal masses and cervical motion tenderness).    - s/p 2 weeks of PO abx (Levoquin / Flagyl)   - admits to persistent pelvic pain despite therapy   - Pt would like to proceed with Hysteroscopy, D and C, Diagnostic Laparoscopy with BL cystectomy, possible LSO, RSO or BSO (with or without frozen section), possible adhesiolysis, possible ablation of endometriosis. - Pt tolerates Percocet and Ibuprofen for post op pain      2. Bilateral ovarian cysts  - When compared to previous US, Right ovary remains similar in size and consistency. Left ovary has increased in size marginally with persistent papillary projection. MRI did not show a papillation within the left ovary. Right ovary enlarged with multiple simple cysts (see above). - Tumor markers reassuring   - Plan to proceed with Dx Lap and BL Cystectomy     3. Abnormal uterine bleeding (AUB)  - suspect AUB related to intrauterine polyp  - due to risk factors (obesity, thickened stripe on US, AUB, pelvic pain) offered in-office EMBx, but elected for Hysteroscopy D and C     4. Morbid obesity with BMI of 40.0-44.9, adult ([facility])     5. Pre-hypertension     6. Tobacco user    7. Nausea  - ondansetron (ZOFRAN ODT) 4 MG disintegrating tablet; Take 1 tablet by mouth every 8 hours as needed for Nausea  Dispense: 20 tablet; Refill: 3  - use for before and after surgery as needed      8. Skin tag  - on vulva, plan for removal at time of surgery     Follow Up   Return for Post Op.     Laurence Weller,

## 2019-06-12 NOTE — PROGRESS NOTES
Obstructive Sleep Apnea (OLVIN) Screening     Patient:  Yonas Ann    YOB: 1990      Medical Record #:  [id number]                     Date:  6/12/2019     1. Are you a loud and/or regular snorer? []  Yes       [x] No    2. Have you been observed to gasp or stop breathing during sleep? []  Yes       [x] No    3. Do you feel tired or groggy upon awakening or do you awaken with a headache?           []  Yes       [x] No    4. Are you often tired or fatigued during the wake time hours? []  Yes       [x] No    5. Do you fall asleep sitting, reading, watching TV or driving? []  Yes       [x] No    6. Do you often have problems with memory or concentration? []  Yes       [x] No    **If patient's score is ? 3 they are considered high risk for OLVIN. Notify the anesthesiologist of the high risk and document in focus note. Note:  If the patient's BMI is more than 35 kg m¯² , has neck circumference > 40 cm, and/or high blood pressure the risk is greater (© [employer]nea [employer], 2006).

## 2019-06-14 ENCOUNTER — ANESTHESIA EVENT (OUTPATIENT)
Dept: OPERATING ROOM | Age: 29
End: 2019-06-14
Payer: COMMERCIAL

## 2019-06-17 ENCOUNTER — HOSPITAL ENCOUNTER (OUTPATIENT)
Age: 29
Setting detail: OUTPATIENT SURGERY
Discharge: HOME | End: 2019-06-17
Attending: OBSTETRICS & GYNECOLOGY | Admitting: OBSTETRICS & GYNECOLOGY
Payer: COMMERCIAL

## 2019-06-17 ENCOUNTER — ANESTHESIA (OUTPATIENT)
Dept: OPERATING ROOM | Age: 29
End: 2019-06-17
Payer: COMMERCIAL

## 2019-06-17 VITALS
WEIGHT: 258 LBS | BODY MASS INDEX: 41.46 KG/M2 | TEMPERATURE: 97.3 F | SYSTOLIC BLOOD PRESSURE: 138 MMHG | DIASTOLIC BLOOD PRESSURE: 70 MMHG | RESPIRATION RATE: 18 BRPM | OXYGEN SATURATION: 98 % | HEIGHT: 66 IN | HEART RATE: 72 BPM

## 2019-06-17 VITALS — TEMPERATURE: 97 F | DIASTOLIC BLOOD PRESSURE: 115 MMHG | OXYGEN SATURATION: 99 % | SYSTOLIC BLOOD PRESSURE: 137 MMHG

## 2019-06-17 DIAGNOSIS — N83.201 BILATERAL OVARIAN CYSTS: ICD-10-CM

## 2019-06-17 DIAGNOSIS — N93.9 ABNORMAL UTERINE BLEEDING: ICD-10-CM

## 2019-06-17 DIAGNOSIS — N83.202 BILATERAL OVARIAN CYSTS: ICD-10-CM

## 2019-06-17 DIAGNOSIS — Z98.890 S/P LAPAROSCOPY: Primary | ICD-10-CM

## 2019-06-17 DIAGNOSIS — R10.2 PELVIC PAIN IN FEMALE: ICD-10-CM

## 2019-06-17 LAB
ANION GAP SERPL CALCULATED.3IONS-SCNC: 11 MMOL/L (ref 3–16)
BUN BLDV-MCNC: 7 MG/DL (ref 7–20)
CALCIUM SERPL-MCNC: 9.1 MG/DL (ref 8.3–10.6)
CHLORIDE BLD-SCNC: 104 MMOL/L (ref 99–110)
CO2: 25 MMOL/L (ref 21–32)
CREAT SERPL-MCNC: 0.7 MG/DL (ref 0.6–1.1)
GFR AFRICAN AMERICAN: >60
GFR NON-AFRICAN AMERICAN: >60
GLUCOSE BLD-MCNC: 86 MG/DL (ref 70–99)
HCT VFR BLD CALC: 38.7 % (ref 36–48)
HEMOGLOBIN: 12.9 G/DL (ref 12–16)
MCH RBC QN AUTO: 28.1 PG (ref 26–34)
MCHC RBC AUTO-ENTMCNC: 33.4 G/DL (ref 31–36)
MCV RBC AUTO: 84.2 FL (ref 80–100)
PDW BLD-RTO: 14 % (ref 12.4–15.4)
PLATELET # BLD: 231 K/UL (ref 135–450)
PMV BLD AUTO: 7 FL (ref 5–10.5)
POTASSIUM SERPL-SCNC: 3.5 MMOL/L (ref 3.5–5.1)
PREGNANCY, URINE: NEGATIVE
RBC # BLD: 4.6 M/UL (ref 4–5.2)
SODIUM BLD-SCNC: 140 MMOL/L (ref 136–145)
WBC # BLD: 7.3 K/UL (ref 4–11)

## 2019-06-17 PROCEDURE — 58662 LAPAROSCOPY EXCISE LESIONS: CPT | Performed by: OBSTETRICS & GYNECOLOGY

## 2019-06-17 PROCEDURE — 2720000010 HC SURG SUPPLY STERILE: Performed by: OBSTETRICS & GYNECOLOGY

## 2019-06-17 PROCEDURE — 7100000001 HC PACU RECOVERY - ADDTL 15 MIN: Performed by: OBSTETRICS & GYNECOLOGY

## 2019-06-17 PROCEDURE — 2500000003 HC RX 250 WO HCPCS: Performed by: ANESTHESIOLOGY

## 2019-06-17 PROCEDURE — 88305 TISSUE EXAM BY PATHOLOGIST: CPT

## 2019-06-17 PROCEDURE — 3700000001 HC ADD 15 MINUTES (ANESTHESIA): Performed by: OBSTETRICS & GYNECOLOGY

## 2019-06-17 PROCEDURE — 7100000000 HC PACU RECOVERY - FIRST 15 MIN: Performed by: OBSTETRICS & GYNECOLOGY

## 2019-06-17 PROCEDURE — 6360000002 HC RX W HCPCS: Performed by: NURSE ANESTHETIST, CERTIFIED REGISTERED

## 2019-06-17 PROCEDURE — 2580000003 HC RX 258: Performed by: ANESTHESIOLOGY

## 2019-06-17 PROCEDURE — 2500000003 HC RX 250 WO HCPCS

## 2019-06-17 PROCEDURE — 80048 BASIC METABOLIC PNL TOTAL CA: CPT

## 2019-06-17 PROCEDURE — 85027 COMPLETE CBC AUTOMATED: CPT

## 2019-06-17 PROCEDURE — 2709999900 HC NON-CHARGEABLE SUPPLY: Performed by: OBSTETRICS & GYNECOLOGY

## 2019-06-17 PROCEDURE — 3600000004 HC SURGERY LEVEL 4 BASE: Performed by: OBSTETRICS & GYNECOLOGY

## 2019-06-17 PROCEDURE — 3600000014 HC SURGERY LEVEL 4 ADDTL 15MIN: Performed by: OBSTETRICS & GYNECOLOGY

## 2019-06-17 PROCEDURE — 58558 HYSTEROSCOPY BIOPSY: CPT | Performed by: OBSTETRICS & GYNECOLOGY

## 2019-06-17 PROCEDURE — 2580000003 HC RX 258: Performed by: OBSTETRICS & GYNECOLOGY

## 2019-06-17 PROCEDURE — 3700000000 HC ANESTHESIA ATTENDED CARE: Performed by: OBSTETRICS & GYNECOLOGY

## 2019-06-17 PROCEDURE — 2780000010 HC IMPLANT OTHER: Performed by: OBSTETRICS & GYNECOLOGY

## 2019-06-17 PROCEDURE — 2500000003 HC RX 250 WO HCPCS: Performed by: NURSE ANESTHETIST, CERTIFIED REGISTERED

## 2019-06-17 PROCEDURE — 6360000002 HC RX W HCPCS: Performed by: ANESTHESIOLOGY

## 2019-06-17 PROCEDURE — 2500000003 HC RX 250 WO HCPCS: Performed by: OBSTETRICS & GYNECOLOGY

## 2019-06-17 PROCEDURE — 84703 CHORIONIC GONADOTROPIN ASSAY: CPT

## 2019-06-17 DEVICE — AGENT HEMSTAT 8ML FLX TIP MTRX + DISP SURGIFLO: Type: IMPLANTABLE DEVICE | Site: OVARY | Status: FUNCTIONAL

## 2019-06-17 RX ORDER — PROPOFOL 10 MG/ML
INJECTION, EMULSION INTRAVENOUS PRN
Status: DISCONTINUED | OUTPATIENT
Start: 2019-06-17 | End: 2019-06-17 | Stop reason: SDUPTHER

## 2019-06-17 RX ORDER — SODIUM CHLORIDE, SODIUM LACTATE, POTASSIUM CHLORIDE, AND CALCIUM CHLORIDE .6; .31; .03; .02 G/100ML; G/100ML; G/100ML; G/100ML
IRRIGANT IRRIGATION PRN
Status: DISCONTINUED | OUTPATIENT
Start: 2019-06-17 | End: 2019-06-17 | Stop reason: ALTCHOICE

## 2019-06-17 RX ORDER — HYDROMORPHONE HCL 110MG/55ML
PATIENT CONTROLLED ANALGESIA SYRINGE INTRAVENOUS PRN
Status: DISCONTINUED | OUTPATIENT
Start: 2019-06-17 | End: 2019-06-17 | Stop reason: SDUPTHER

## 2019-06-17 RX ORDER — FENTANYL CITRATE 50 UG/ML
INJECTION, SOLUTION INTRAMUSCULAR; INTRAVENOUS PRN
Status: DISCONTINUED | OUTPATIENT
Start: 2019-06-17 | End: 2019-06-17 | Stop reason: SDUPTHER

## 2019-06-17 RX ORDER — OXYCODONE HYDROCHLORIDE AND ACETAMINOPHEN 5; 325 MG/1; MG/1
1 TABLET ORAL PRN
Status: DISCONTINUED | OUTPATIENT
Start: 2019-06-17 | End: 2019-06-17 | Stop reason: HOSPADM

## 2019-06-17 RX ORDER — PROMETHAZINE HYDROCHLORIDE 25 MG/ML
6.25 INJECTION, SOLUTION INTRAMUSCULAR; INTRAVENOUS
Status: DISCONTINUED | OUTPATIENT
Start: 2019-06-17 | End: 2019-06-17 | Stop reason: HOSPADM

## 2019-06-17 RX ORDER — DEXAMETHASONE SODIUM PHOSPHATE 4 MG/ML
INJECTION, SOLUTION INTRA-ARTICULAR; INTRALESIONAL; INTRAMUSCULAR; INTRAVENOUS; SOFT TISSUE PRN
Status: DISCONTINUED | OUTPATIENT
Start: 2019-06-17 | End: 2019-06-17 | Stop reason: SDUPTHER

## 2019-06-17 RX ORDER — IBUPROFEN 800 MG/1
800 TABLET ORAL EVERY 8 HOURS PRN
Qty: 30 TABLET | Refills: 0 | Status: SHIPPED | OUTPATIENT
Start: 2019-06-17 | End: 2020-07-04

## 2019-06-17 RX ORDER — MORPHINE SULFATE 2 MG/ML
2 INJECTION, SOLUTION INTRAMUSCULAR; INTRAVENOUS EVERY 5 MIN PRN
Status: DISCONTINUED | OUTPATIENT
Start: 2019-06-17 | End: 2019-06-17 | Stop reason: HOSPADM

## 2019-06-17 RX ORDER — HYDRALAZINE HYDROCHLORIDE 20 MG/ML
5 INJECTION INTRAMUSCULAR; INTRAVENOUS EVERY 10 MIN PRN
Status: DISCONTINUED | OUTPATIENT
Start: 2019-06-17 | End: 2019-06-17 | Stop reason: HOSPADM

## 2019-06-17 RX ORDER — MAGNESIUM HYDROXIDE 1200 MG/15ML
LIQUID ORAL CONTINUOUS PRN
Status: COMPLETED | OUTPATIENT
Start: 2019-06-17 | End: 2019-06-17

## 2019-06-17 RX ORDER — GLYCOPYRROLATE 0.2 MG/ML
0.2 INJECTION INTRAMUSCULAR; INTRAVENOUS ONCE
Status: COMPLETED | OUTPATIENT
Start: 2019-06-17 | End: 2019-06-17

## 2019-06-17 RX ORDER — ROCURONIUM BROMIDE 10 MG/ML
INJECTION, SOLUTION INTRAVENOUS PRN
Status: DISCONTINUED | OUTPATIENT
Start: 2019-06-17 | End: 2019-06-17 | Stop reason: SDUPTHER

## 2019-06-17 RX ORDER — ONDANSETRON 2 MG/ML
4 INJECTION INTRAMUSCULAR; INTRAVENOUS PRN
Status: DISCONTINUED | OUTPATIENT
Start: 2019-06-17 | End: 2019-06-17 | Stop reason: HOSPADM

## 2019-06-17 RX ORDER — MORPHINE SULFATE 2 MG/ML
1 INJECTION, SOLUTION INTRAMUSCULAR; INTRAVENOUS EVERY 5 MIN PRN
Status: DISCONTINUED | OUTPATIENT
Start: 2019-06-17 | End: 2019-06-17 | Stop reason: HOSPADM

## 2019-06-17 RX ORDER — DIPHENHYDRAMINE HYDROCHLORIDE 50 MG/ML
12.5 INJECTION INTRAMUSCULAR; INTRAVENOUS
Status: DISCONTINUED | OUTPATIENT
Start: 2019-06-17 | End: 2019-06-17 | Stop reason: HOSPADM

## 2019-06-17 RX ORDER — GLYCOPYRROLATE 0.2 MG/ML
INJECTION INTRAMUSCULAR; INTRAVENOUS
Status: COMPLETED
Start: 2019-06-17 | End: 2019-06-17

## 2019-06-17 RX ORDER — MIDAZOLAM HYDROCHLORIDE 1 MG/ML
INJECTION INTRAMUSCULAR; INTRAVENOUS PRN
Status: DISCONTINUED | OUTPATIENT
Start: 2019-06-17 | End: 2019-06-17 | Stop reason: SDUPTHER

## 2019-06-17 RX ORDER — ONDANSETRON 2 MG/ML
INJECTION INTRAMUSCULAR; INTRAVENOUS PRN
Status: DISCONTINUED | OUTPATIENT
Start: 2019-06-17 | End: 2019-06-17 | Stop reason: SDUPTHER

## 2019-06-17 RX ORDER — LIDOCAINE HYDROCHLORIDE 20 MG/ML
INJECTION, SOLUTION INFILTRATION; PERINEURAL PRN
Status: DISCONTINUED | OUTPATIENT
Start: 2019-06-17 | End: 2019-06-17 | Stop reason: SDUPTHER

## 2019-06-17 RX ORDER — KETOROLAC TROMETHAMINE 30 MG/ML
INJECTION, SOLUTION INTRAMUSCULAR; INTRAVENOUS PRN
Status: DISCONTINUED | OUTPATIENT
Start: 2019-06-17 | End: 2019-06-17 | Stop reason: SDUPTHER

## 2019-06-17 RX ORDER — BUPIVACAINE HYDROCHLORIDE AND EPINEPHRINE 5; 5 MG/ML; UG/ML
INJECTION, SOLUTION PERINEURAL PRN
Status: DISCONTINUED | OUTPATIENT
Start: 2019-06-17 | End: 2019-06-17 | Stop reason: ALTCHOICE

## 2019-06-17 RX ORDER — LABETALOL HYDROCHLORIDE 5 MG/ML
5 INJECTION, SOLUTION INTRAVENOUS EVERY 10 MIN PRN
Status: DISCONTINUED | OUTPATIENT
Start: 2019-06-17 | End: 2019-06-17 | Stop reason: HOSPADM

## 2019-06-17 RX ORDER — OXYCODONE HYDROCHLORIDE AND ACETAMINOPHEN 5; 325 MG/1; MG/1
2 TABLET ORAL PRN
Status: DISCONTINUED | OUTPATIENT
Start: 2019-06-17 | End: 2019-06-17 | Stop reason: HOSPADM

## 2019-06-17 RX ORDER — MEPERIDINE HYDROCHLORIDE 50 MG/ML
12.5 INJECTION INTRAMUSCULAR; INTRAVENOUS; SUBCUTANEOUS EVERY 5 MIN PRN
Status: DISCONTINUED | OUTPATIENT
Start: 2019-06-17 | End: 2019-06-17 | Stop reason: HOSPADM

## 2019-06-17 RX ORDER — SODIUM CHLORIDE, SODIUM LACTATE, POTASSIUM CHLORIDE, CALCIUM CHLORIDE 600; 310; 30; 20 MG/100ML; MG/100ML; MG/100ML; MG/100ML
INJECTION, SOLUTION INTRAVENOUS CONTINUOUS
Status: DISCONTINUED | OUTPATIENT
Start: 2019-06-17 | End: 2019-06-17 | Stop reason: HOSPADM

## 2019-06-17 RX ORDER — OXYCODONE HYDROCHLORIDE AND ACETAMINOPHEN 5; 325 MG/1; MG/1
1 TABLET ORAL EVERY 6 HOURS PRN
Qty: 15 TABLET | Refills: 0 | Status: SHIPPED | OUTPATIENT
Start: 2019-06-17 | End: 2019-06-18

## 2019-06-17 RX ADMIN — SODIUM CHLORIDE, POTASSIUM CHLORIDE, SODIUM LACTATE AND CALCIUM CHLORIDE: 600; 310; 30; 20 INJECTION, SOLUTION INTRAVENOUS at 12:20

## 2019-06-17 RX ADMIN — GLYCOPYRROLATE 0.2 MG: 0.2 INJECTION, SOLUTION INTRAMUSCULAR; INTRAVENOUS at 17:33

## 2019-06-17 RX ADMIN — LIDOCAINE HYDROCHLORIDE 60 MG: 20 INJECTION, SOLUTION INFILTRATION; PERINEURAL at 15:01

## 2019-06-17 RX ADMIN — HYDROMORPHONE HYDROCHLORIDE 0.5 MG: 1 INJECTION, SOLUTION INTRAMUSCULAR; INTRAVENOUS; SUBCUTANEOUS at 16:57

## 2019-06-17 RX ADMIN — HYDROMORPHONE HYDROCHLORIDE 0.5 MG: 2 INJECTION INTRAMUSCULAR; INTRAVENOUS; SUBCUTANEOUS at 16:16

## 2019-06-17 RX ADMIN — FENTANYL CITRATE 150 MCG: 50 INJECTION INTRAMUSCULAR; INTRAVENOUS at 15:01

## 2019-06-17 RX ADMIN — ONDANSETRON 4 MG: 2 INJECTION INTRAMUSCULAR; INTRAVENOUS at 19:25

## 2019-06-17 RX ADMIN — ROCURONIUM BROMIDE 50 MG: 10 SOLUTION INTRAVENOUS at 15:02

## 2019-06-17 RX ADMIN — PROPOFOL 200 MG: 10 INJECTION, EMULSION INTRAVENOUS at 15:02

## 2019-06-17 RX ADMIN — HYDROMORPHONE HYDROCHLORIDE 0.5 MG: 2 INJECTION INTRAMUSCULAR; INTRAVENOUS; SUBCUTANEOUS at 16:32

## 2019-06-17 RX ADMIN — SODIUM CHLORIDE, POTASSIUM CHLORIDE, SODIUM LACTATE AND CALCIUM CHLORIDE: 600; 310; 30; 20 INJECTION, SOLUTION INTRAVENOUS at 15:41

## 2019-06-17 RX ADMIN — KETOROLAC TROMETHAMINE 30 MG: 30 INJECTION, SOLUTION INTRAMUSCULAR; INTRAVENOUS at 15:21

## 2019-06-17 RX ADMIN — FENTANYL CITRATE 100 MCG: 50 INJECTION INTRAMUSCULAR; INTRAVENOUS at 15:28

## 2019-06-17 RX ADMIN — HYDROMORPHONE HYDROCHLORIDE 0.5 MG: 1 INJECTION, SOLUTION INTRAMUSCULAR; INTRAVENOUS; SUBCUTANEOUS at 17:05

## 2019-06-17 RX ADMIN — ONDANSETRON 4 MG: 2 INJECTION INTRAMUSCULAR; INTRAVENOUS at 15:02

## 2019-06-17 RX ADMIN — MIDAZOLAM HYDROCHLORIDE 2 MG: 2 INJECTION, SOLUTION INTRAMUSCULAR; INTRAVENOUS at 14:57

## 2019-06-17 RX ADMIN — HYDROMORPHONE HYDROCHLORIDE 0.5 MG: 2 INJECTION INTRAMUSCULAR; INTRAVENOUS; SUBCUTANEOUS at 15:56

## 2019-06-17 RX ADMIN — GLYCOPYRROLATE 0.2 MG: 0.2 INJECTION, SOLUTION INTRAMUSCULAR; INTRAVENOUS at 17:43

## 2019-06-17 RX ADMIN — DEXAMETHASONE SODIUM PHOSPHATE 10 MG: 4 INJECTION, SOLUTION INTRAMUSCULAR; INTRAVENOUS at 15:02

## 2019-06-17 RX ADMIN — SUGAMMADEX 234 MG: 100 INJECTION, SOLUTION INTRAVENOUS at 16:20

## 2019-06-17 RX ADMIN — GLYCOPYRROLATE 0.2 MG: 0.2 INJECTION INTRAMUSCULAR; INTRAVENOUS at 17:33

## 2019-06-17 ASSESSMENT — PULMONARY FUNCTION TESTS
PIF_VALUE: 41
PIF_VALUE: 33
PIF_VALUE: 1
PIF_VALUE: 4
PIF_VALUE: 20
PIF_VALUE: 19
PIF_VALUE: 20
PIF_VALUE: 29
PIF_VALUE: 28
PIF_VALUE: 4
PIF_VALUE: 1
PIF_VALUE: 25
PIF_VALUE: 16
PIF_VALUE: 4
PIF_VALUE: 3
PIF_VALUE: 21
PIF_VALUE: 20
PIF_VALUE: 33
PIF_VALUE: 33
PIF_VALUE: 20
PIF_VALUE: 13
PIF_VALUE: 33
PIF_VALUE: 6
PIF_VALUE: 33
PIF_VALUE: 4
PIF_VALUE: 12
PIF_VALUE: 21
PIF_VALUE: 17
PIF_VALUE: 0
PIF_VALUE: 33
PIF_VALUE: 20
PIF_VALUE: 11
PIF_VALUE: 17
PIF_VALUE: 5
PIF_VALUE: 20
PIF_VALUE: 33
PIF_VALUE: 3
PIF_VALUE: 28
PIF_VALUE: 24
PIF_VALUE: 19
PIF_VALUE: 33
PIF_VALUE: 28
PIF_VALUE: 18
PIF_VALUE: 15
PIF_VALUE: 33
PIF_VALUE: 0
PIF_VALUE: 20
PIF_VALUE: 0
PIF_VALUE: 10
PIF_VALUE: 33
PIF_VALUE: 10
PIF_VALUE: 13
PIF_VALUE: 19
PIF_VALUE: 7
PIF_VALUE: 17
PIF_VALUE: 33
PIF_VALUE: 20
PIF_VALUE: 18
PIF_VALUE: 20
PIF_VALUE: 33
PIF_VALUE: 18
PIF_VALUE: 33
PIF_VALUE: 18
PIF_VALUE: 33
PIF_VALUE: 28
PIF_VALUE: 33
PIF_VALUE: 28
PIF_VALUE: 17
PIF_VALUE: 28
PIF_VALUE: 33
PIF_VALUE: 33
PIF_VALUE: 21
PIF_VALUE: 33
PIF_VALUE: 17
PIF_VALUE: 2
PIF_VALUE: 28
PIF_VALUE: 20
PIF_VALUE: 33
PIF_VALUE: 4
PIF_VALUE: 16
PIF_VALUE: 28
PIF_VALUE: 18
PIF_VALUE: 33
PIF_VALUE: 33
PIF_VALUE: 20
PIF_VALUE: 0
PIF_VALUE: 33
PIF_VALUE: 33
PIF_VALUE: 4
PIF_VALUE: 20
PIF_VALUE: 28
PIF_VALUE: 4
PIF_VALUE: 1
PIF_VALUE: 5
PIF_VALUE: 1
PIF_VALUE: 3
PIF_VALUE: 17

## 2019-06-17 ASSESSMENT — PAIN SCALES - GENERAL
PAINLEVEL_OUTOF10: 9
PAINLEVEL_OUTOF10: 8

## 2019-06-17 ASSESSMENT — PAIN - FUNCTIONAL ASSESSMENT: PAIN_FUNCTIONAL_ASSESSMENT: 0-10

## 2019-06-17 ASSESSMENT — PAIN DESCRIPTION - LOCATION: LOCATION: ABDOMEN

## 2019-06-17 ASSESSMENT — PAIN DESCRIPTION - DESCRIPTORS: DESCRIPTORS: ACHING;BURNING;CRAMPING;SPASM

## 2019-06-17 NOTE — ANESTHESIA PRE PROCEDURE
Department of Anesthesiology  Preprocedure Note       Name:  Bert Amador   Age:  33 y.o.  :  1990                                          MRN:  [id number]         Date:  2019      Surgeon: Kashif Mariano):  Rishi Santacruz DO    Procedure: DIAGNOSTIC LAPAROSCOPY, POSSIBLE BILATERAL OVARIAN CYSTECTOMY, POSSIBLE OOPHORECTOMY WITH (N/A Abdomen)  DILATATION AND CURETTAGE HYSTEROSCOPY (N/A Uterus)    Medications prior to admission:   Prior to Admission medications    Medication Sig Start Date End Date Taking? Authorizing Provider   ondansetron (ZOFRAN ODT) 4 MG disintegrating tablet Take 1 tablet by mouth every 8 hours as needed for Nausea 19   Rishi Santacruz DO   docusate sodium (COLACE, DULCOLAX) 100 MG CAPS Take 100 mg by mouth 2 times daily as needed for Constipation 19   Claudio Kwong,    etonogestrel (NEXPLANON) 68 MG implant 68 mg by Subdermal route See Admin Instructions Birth control -    Historical Provider, MD       Current medications:    No current facility-administered medications for this encounter.       Current Outpatient Medications   Medication Sig Dispense Refill    ondansetron (ZOFRAN ODT) 4 MG disintegrating tablet Take 1 tablet by mouth every 8 hours as needed for Nausea 20 tablet 3    docusate sodium (COLACE, DULCOLAX) 100 MG CAPS Take 100 mg by mouth 2 times daily as needed for Constipation 20 capsule 0    etonogestrel (NEXPLANON) 68 MG implant 68 mg by Subdermal route See Admin Instructions Birth control -         Allergies:  No Known Allergies    Problem List:    Patient Active Problem List   Diagnosis Code    Pelvic pain R10.2    Bilateral ovarian cysts N83.201, J50.730       Past Medical History:        Diagnosis Date    Hypertension     occas    Ovarian cyst     Tight ring on finger     left hand       Past Surgical History:        Procedure Laterality Date    DILATION AND CURETTAGE      DILATION AND CURETTAGE OF UTERUS      NICOLETTE TOOTH EXTRACTION  2009       Social History:    Social History     Tobacco Use    Smoking status: Current Every Day Smoker     Packs/day: 0.25     Years: 3.00     Pack years: 0.75     Types: E-Cigarettes    Smokeless tobacco: Never Used   Substance Use Topics    Alcohol use: Yes     Comment: rarely                                Ready to quit: Not Answered  Counseling given: Yes      Vital Signs (Current):   Vitals:    06/06/19 1539   Weight: 261 lb (118.4 kg)   Height: 5' 4.75\" (1.645 m)                                              BP Readings from Last 3 Encounters:   06/11/19 124/78   06/04/19 126/86   05/14/19 132/88       NPO Status:                                                                                 BMI:   Wt Readings from Last 3 Encounters:   06/11/19 258 lb (117 kg)   06/04/19 261 lb 3.2 oz (118.5 kg)   05/14/19 261 lb 9.6 oz (118.7 kg)     Body mass index is 43.77 kg/m². CBC:   Lab Results   Component Value Date    WBC 9.3 05/10/2019    RBC 5.10 05/10/2019    HGB 14.2 05/10/2019    HCT 43.1 05/10/2019    MCV 84.5 05/10/2019    RDW 14.0 05/10/2019     05/10/2019       CMP:   Lab Results   Component Value Date     04/25/2019    K 3.9 04/25/2019     04/25/2019    CO2 22 04/25/2019    BUN 7 04/25/2019    CREATININE 0.7 04/25/2019    GFRAA >60 04/25/2019    GFRAA >60 04/25/2012    AGRATIO 1.1 04/25/2019    LABGLOM >60 04/25/2019    GLUCOSE 85 04/25/2019    PROT 7.4 04/25/2019    PROT 6.3 04/25/2012    CALCIUM 8.9 04/25/2019    BILITOT 0.3 04/25/2019    ALKPHOS 74 04/25/2019    AST 17 04/25/2019    ALT 14 04/25/2019       POC Tests: No results for input(s): POCGLU, POCNA, POCK, POCCL, POCBUN, POCHEMO, POCHCT in the last 72 hours.     Coags:   Lab Results   Component Value Date    PROTIME 12.0 06/29/2018    INR 1.05 06/29/2018       HCG (If Applicable):   Lab Results   Component Value Date    PREGTESTUR Negative 04/02/2015        ABGs: No results found for: PHART, PO2ART, GPV4RDK, PQJ4MZF, BEART, L1IQLKKG     Type & Screen (If Applicable):  No results found for: LABABO, [address]    Anesthesia Evaluation  Patient summary reviewed and Nursing notes reviewed no history of anesthetic complications:   Airway: Mallampati: III     Neck ROM: full   Dental:          Pulmonary:Negative Pulmonary ROS and normal exam                               Cardiovascular:Negative CV ROS    (+) hypertension:,                   Neuro/Psych:   Negative Neuro/Psych ROS              GI/Hepatic/Renal: Neg GI/Hepatic/Renal ROS       (-) hiatal hernia and GERD       Endo/Other: Negative Endo/Other ROS                    Abdominal:           Vascular:                                      Anesthesia Plan      general     ASA 3     (I discussed with the patient the risks and benefits of PIV, general anesthesia, IV Narcotics, PACU. All questions were answered the patient agrees with the plan.)  Induction: intravenous. Pre-Operative Diagnosis: -    33 y.o.   BMI:  Body mass index is 41.64 kg/m².      Vitals:    06/06/19 1539 06/17/19 1200   BP:  (!) 141/105   Pulse:  104   Resp:  12   Temp:  99.1 °F (37.3 °C)   TempSrc:  Temporal   SpO2:  96%   Weight: 261 lb (118.4 kg) 258 lb (117 kg)   Height: 5' 4.75\" (1.645 m) 5' 6\" (1.676 m)     No Known Allergies    Social History     Tobacco Use    Smoking status: Current Every Day Smoker     Packs/day: 0.25     Years: 3.00     Pack years: 0.75     Types: E-Cigarettes    Smokeless tobacco: Never Used   Substance Use Topics    Alcohol use: Yes     Comment: rarely     LABS:    CBC  Lab Results   Component Value Date/Time    WBC 7.3 06/17/2019 12:25 PM    HGB 12.9 06/17/2019 12:25 PM    HCT 38.7 06/17/2019 12:25 PM     06/17/2019 12:25 PM     RENAL  Lab Results   Component Value Date/Time     06/17/2019 12:25 PM    K 3.5 06/17/2019 12:25 PM     06/17/2019 12:25 PM    CO2 25 06/17/2019 12:25 PM    BUN 7 06/17/2019 12:25 PM    CREATININE 0.7

## 2019-06-17 NOTE — PROGRESS NOTES
Spoke to Dr. Nathaniel Yusuf -updated patient pain under control, has nausea, occasional desaturation to upper 80% when sleeping. Will continue to monitor.

## 2019-06-17 NOTE — H&P
Patient seen and evaluated prior to surgery. Patient reports no changes in medical condition. There have been no changes in the patient's medications or assessment. Preoperative tests and diagnostics have been reviewed. Surgery remains indicated as noted in History and Physical (H&P). Prophylactic antibiotics, use of beta-blockers and VTE prophylaxis have been addressed/ordered as indicated. Please see H&P and orders. Adding Skin tag of vulva removal to procedure, patient has consented.      Dougie Bruce, DO

## 2019-06-17 NOTE — PROGRESS NOTES
Pt is ready for surgery. Family at bedside. Adv of surgery delay. Gas card given to pt mother. Skin is clear 34 yo no med hx.

## 2019-06-17 NOTE — PROGRESS NOTES
Patient awake and alert. Describes slight right upper chest discomfort-mild and continuous. States has nausea. Medicated for nausea with Zofran. Mother updated by phone. O2 off. Sats will drop to 88% briefly when dozes off and then go back up without intervention. States feels very tired and has not slept well all week dur to sick child at home. [place] pad changed -small amount serosanguinous drainage. 3 lap sites with dry dressing and tegaderm are dry and intact. fdeclined soda or crackers due to nausea.

## 2019-06-17 NOTE — BRIEF OP NOTE
Brief Postoperative Note  ______________________________________________________________    Patient: Eliane Freire  YOB: 1990  MRN: [id number]  Date of Procedure: 6/17/2019    Pre-Op Diagnosis: 1) Acute on Chronic Pelvic Pain, 2) Bilateral Ovarian Cysts, 3) AUB     Post-Op Diagnosis: Same       Procedure(s):  DIAGNOSTIC LAPAROSCOPY, RIGHT OVARIAN CYSTECTOMY  DILATATION AND CURETTAGE HYSTEROSCOPY    Anesthesia: General    Surgeon(s):  Rupa Carrero DO    Assistant: Shelton Mcclelland     Estimated Blood Loss (mL): less than 50    IVF: 6803 mL     Complications: None    Specimens:   ID Type Source Tests Collected by Time Destination   A : Endocervical curettings Tissue Tissue SURGICAL PATHOLOGY [facility],  6/17/2019 1521    B : Endometrial curettings Tissue Tissue SURGICAL PATHOLOGY [facility]  6/17/2019 1533    C : Right ovarian cyst wall Tissue Tissue SURGICAL PATHOLOGY [facility],  6/17/2019 1548        Implants:  Implant Name Type Inv.  Item Serial No.  Lot No. LRB No. Used   IMPL SEALANT SURGIFLO HEMOSTAT MATRIC Bone/Graft/Tissue/Human/Synth IMPL SEALANT SURGIFLO HEMOSTAT MATRIC  JN: [facility]TH[facility] [id number] Right 1         Drains:   [REMOVED] Urethral Catheter Non-latex 16 fr (Removed)       Findings:   1) Normal appearing external genitalia, urethra  2) Moderately proliferative endometrium   3) patent BL tubal ostia   4) Normal appearing uterus, BL tubes, BL ureters    5) Normal appearing L ovary   6) Enlarged Right ovary with 2 simple cysts, clear cystic fluid     Dictation:   [id number]    Rupa Carrero DO  Date: 6/17/2019  Time: 4:21 PM

## 2019-06-18 NOTE — ANESTHESIA POSTPROCEDURE EVALUATION
Department of Anesthesiology  Postprocedure Note    Patient: Jim Ho  MRN: [id number]  YOB: 1990  Date of evaluation: 6/17/2019  Time:  9:36 PM     Procedure Summary     Date:  06/17/19 Room / Location:  [facility] OR [address] [place] [address] OR    Anesthesia Start:  3617 Anesthesia Stop:  1637    Procedures:       DIAGNOSTIC LAPAROSCOPY, RIGHT OVARIAN CYSTECTOMY (N/A Abdomen)      DILATATION AND CURETTAGE HYSTEROSCOPY (N/A Uterus) Diagnosis:       Pelvic pain in female      Bilateral ovarian cysts      Abnormal uterine bleeding      (-)    Surgeon:  Guillermo Kennedy DO Responsible Provider:  Dequan Taylor MD    Anesthesia Type:  general ASA Status:  3          Anesthesia Type: general    Annie Phase I: Annie Score: 10    Annie Phase II:      Last vitals: Reviewed and per EMR flowsheets.        Anesthesia Post Evaluation    Comments: Postoperative Anesthesia Note    Name:    Jim Ho  MRN:      [id number]    Patient Vitals in the past 12 hrs:  06/17/19 2000, BP:138/70, Pulse:72, Resp:18, SpO2:98 %  06/17/19 1945, BP:(!) 90/58, Temp:97.3 °F (36.3 °C), Temp src:Temporal, Pulse:53, Resp:10, SpO2:96 %  06/17/19 1935, Pulse:59  06/17/19 1930, BP:131/72, Pulse:68, Resp:18, SpO2:96 %  06/17/19 1915, BP:139/72, Pulse:52, Resp:16, SpO2:99 %  06/17/19 1914, Pulse:58, Resp:20, SpO2:99 %  06/17/19 1902, BP:123/62, Pulse:71, Resp:(!) 31, SpO2:(!) 77 %  06/17/19 1857, Pulse:66, Resp:(!) 45, SpO2:99 %  06/17/19 1856, Pulse:54, SpO2:(!) 76 %  06/17/19 1854, Pulse:61, Resp:17, SpO2:94 %  06/17/19 1845, BP:109/63, Pulse:59, Resp:24, SpO2:90 %  06/17/19 1840, BP:131/71, Pulse:64, Resp:9, SpO2:95 %  06/17/19 1835, Pulse:84, Resp:18, SpO2:96 %  06/17/19 1830, BP:(!) 143/86, Pulse:76, Resp:8, SpO2:92 %  06/17/19 1820, Pulse:77, Resp:16, SpO2:98 %  06/17/19 1815, BP:(!) 147/82, Pulse:75, Resp:12, SpO2:100 %  06/17/19 1814, Pulse:76, Resp:11, SpO2:100 %  06/17/19 1807, Pulse:84, Resp:28, SpO2:(!) 84 %  06/17/19 1802,

## 2019-06-18 NOTE — PROGRESS NOTES
Able to get up to bathroom and void. Vomited when returned to bed then reported felt much better. IV discontinued. Discharge instructions reviewed with patient and pts mother. Discharged to home.

## 2019-06-18 NOTE — PROGRESS NOTES
Assisted to bathroom. Gait steady. No c/o nausea. Unable to void. Scant to small amount vaginal drainage.

## 2019-06-20 NOTE — OP NOTE
evaluation, pt consented and scheduled. OPERATIVE PROCEDURE:  She was taken to the operating room where general  anesthesia was administered and found to be adequate. She was placed on  the operating table in the dorsal lithotomy position with Yellofins  stirrups. She was prepped and draped in the normal sterile fashion. Universal time-out was conducted and all parties agreed to   accurate information. A weighted speculum was then placed in the  posterior fornix of the vagina exposing the anterior lip of the cervix. This was then grasped with a single-tooth tenaculum. Endocervical  curettage was performed with a Kevorkian curette. Uterine sound was  performed and noted to be 12-cm. The cervix was then progressively  dilated with Ruddy dilators allowing for a 5-mm hysteroscope was placed  through the uterine os to the fundus. Findings were noted above and  pictures were taken, hysterscope was removed. Global curettage was then performed with a #1  sharp curette. All instruments were then removed from the vagina and an  uterine acorn was placed to the cervical os and attached to the  tenaculum. Gloves were changed. Legs were brought into low lithotomy position. Attention was  turned to the patient's abdomen. Infraumbilical incision  was made with a scalpel under local anesthetic with 0.5% Marcaine with  epinephrine. A 5-mm laparoscope, trocar and sheath were then advanced to  the abdomen under direct visualization. CO2 was turned on and 2 L were  allowed to insufflate the abdomen. A left lower quadrant incision was  then made with a scalpel under local anesthetic and a right lower  quadrant incision with the same process. A 5-mm trocar and sheath were  then advanced to the abdomen on bilateral sides under direct  visualization. Primary survey was performed and findings were noted  above. Cystectomy was then performed on the right ovary with a harmonic  device.   Minimal spillage of ovarian cyst fluid noted to be clear, remainder removed by suction . Cyst wall was then removed from ovary with gentle traction with atraumatic graspers, all  sent to pathology. Pelvis was then irrigated with normal saline and suctioned until clear. Hemostasis was inadequate in the bed of dissection of right cyst.   Surgiflo was placed in the ovarian cyst and hemostasis was then noted after a few moments of observation. Pictures were taken. All instruments were removed from the abdomen. Right and left lower quadrant trocars were removed under direct visualization. The abdomen was adequately deflated. Infraumbilical Laparoscope and trocar were then removed under direct visualization. Vaginal instruments were then removed, hemostasis noted. The patient was taken out of Trendelenburg and lithotomy position. The patient was taken to her postoperative bed in stable condition.     [address] [place] [address],     D: 06/20/2019 8:42:01       T: 06/20/2019 13:42:15     [email or web address]  Job#: [id number]     Doc#: [id number]    CC:

## 2019-06-24 ENCOUNTER — OFFICE VISIT (OUTPATIENT)
Dept: OBGYN CLINIC | Age: 29
End: 2019-06-24

## 2019-06-24 VITALS
BODY MASS INDEX: 41.38 KG/M2 | DIASTOLIC BLOOD PRESSURE: 82 MMHG | HEART RATE: 112 BPM | TEMPERATURE: 98.2 F | WEIGHT: 256.4 LBS | SYSTOLIC BLOOD PRESSURE: 132 MMHG

## 2019-06-24 DIAGNOSIS — Z98.890 S/P LAPAROSCOPY: ICD-10-CM

## 2019-06-24 DIAGNOSIS — R29.818 SUSPECTED SLEEP APNEA: ICD-10-CM

## 2019-06-24 DIAGNOSIS — N83.201 BILATERAL OVARIAN CYSTS: ICD-10-CM

## 2019-06-24 DIAGNOSIS — Z98.890 POST-OPERATIVE STATE: Primary | ICD-10-CM

## 2019-06-24 DIAGNOSIS — N83.202 BILATERAL OVARIAN CYSTS: ICD-10-CM

## 2019-06-24 PROCEDURE — 99024 POSTOP FOLLOW-UP VISIT: CPT | Performed by: OBSTETRICS & GYNECOLOGY

## 2019-06-24 NOTE — PROGRESS NOTES
Outpatient Postoperative Visit     CC:   Chief Complaint   Patient presents with    Post-Op Check       Subjective:  33 y.o. [id number] here for evaluation s/p Dx Lap, R Cystectomy, Hysto D and C  on 6/17/19. Pt seen and examined. Doing well. No concerns complaints. Pain well controlled. +Flatus. Review of Systems - The following ROS was otherwise negative, except as noted in the HPI: constitutional, respiratory, cardiovascular, gastrointestinal, genitourinary    Objective:  /82 (Site: Right Upper Arm, Position: Sitting, Cuff Size: Large Adult)   Pulse 112   Temp 98.2 °F (36.8 °C) (Oral)   Wt 256 lb 6.4 oz (116.3 kg)   LMP 06/17/2019 (Exact Date)   Breastfeeding? No   BMI 41.38 kg/m²   General: Alert, well appearing, no acute distress  Abdomen: Soft, appropriately tender to palpation, non-distended  Incision: Appears c/d/i, no significant drainage or erythema  Extremities: No redness or tenderness in LE, neg Roosevelt's sign    Path:   FINAL DIAGNOSIS:    A. Endocervix, curettings:  - Fragments of benign endocervical mucosa admixed with inflammatory cells  and mucus.  - Negative for dysplasia or malignancy. B. Endometrium, curettings:  - Benign endometrium with fragments suggestive of endometrial polyp.  - Negative for hyperplasia or malignancy. C. Right ovarian cyst wall, cystectomy:  - Fragments of benign corpus luteum cyst.  - Negative for malignancy. Assessment/Plan:  33 y.o. [id number] here for Post Op visit      1. Post-operative state  - doing well, meeting post op milestones     2. S/P laparoscopy    3. Bilateral ovarian cysts  - Right cystectomy, Corpus Luteum   - Left ovarian without obvious cyst at operative time   - plan to recheck with US in 6 MOS     4.  Suspected sleep apnea  - difficult post op period due to recurrent hypoxia with sleeping while coming out of anesthesia   - suspect sleep apnea, will refer to pulm for further workup   - Tiffanie Child MD, Pulmonary,

## 2019-08-05 ENCOUNTER — OFFICE VISIT (OUTPATIENT)
Dept: FAMILY MEDICINE CLINIC | Age: 29
End: 2019-08-05
Payer: COMMERCIAL

## 2019-08-05 VITALS
HEIGHT: 66 IN | BODY MASS INDEX: 41.3 KG/M2 | WEIGHT: 257 LBS | HEART RATE: 82 BPM | SYSTOLIC BLOOD PRESSURE: 118 MMHG | DIASTOLIC BLOOD PRESSURE: 86 MMHG | RESPIRATION RATE: 16 BRPM | OXYGEN SATURATION: 98 %

## 2019-08-05 DIAGNOSIS — R31.9 HEMATURIA, UNSPECIFIED TYPE: ICD-10-CM

## 2019-08-05 DIAGNOSIS — Z23 NEED FOR VACCINATION: ICD-10-CM

## 2019-08-05 LAB
BILIRUBIN, POC: NORMAL
BLOOD URINE, POC: NORMAL
CLARITY, POC: CLEAR
COLOR, POC: YELLOW
CONTROL: NORMAL
GLUCOSE URINE, POC: NORMAL
KETONES, POC: NORMAL
LEUKOCYTE EST, POC: NORMAL
NITRITE, POC: POSITIVE
PH, POC: 6
PREGNANCY TEST URINE, POC: NORMAL
PROTEIN, POC: NORMAL
SPECIFIC GRAVITY, POC: 1.03
UROBILINOGEN, POC: 0.2

## 2019-08-05 PROCEDURE — 90732 PPSV23 VACC 2 YRS+ SUBQ/IM: CPT | Performed by: NURSE PRACTITIONER

## 2019-08-05 PROCEDURE — G8427 DOCREV CUR MEDS BY ELIG CLIN: HCPCS | Performed by: NURSE PRACTITIONER

## 2019-08-05 PROCEDURE — 4004F PT TOBACCO SCREEN RCVD TLK: CPT | Performed by: NURSE PRACTITIONER

## 2019-08-05 PROCEDURE — G8417 CALC BMI ABV UP PARAM F/U: HCPCS | Performed by: NURSE PRACTITIONER

## 2019-08-05 PROCEDURE — 81025 URINE PREGNANCY TEST: CPT | Performed by: NURSE PRACTITIONER

## 2019-08-05 PROCEDURE — 90471 IMMUNIZATION ADMIN: CPT | Performed by: NURSE PRACTITIONER

## 2019-08-05 PROCEDURE — 99213 OFFICE O/P EST LOW 20 MIN: CPT | Performed by: NURSE PRACTITIONER

## 2019-08-05 PROCEDURE — 81002 URINALYSIS NONAUTO W/O SCOPE: CPT | Performed by: NURSE PRACTITIONER

## 2019-08-05 RX ORDER — SULFAMETHOXAZOLE AND TRIMETHOPRIM 800; 160 MG/1; MG/1
1 TABLET ORAL 2 TIMES DAILY
Qty: 10 TABLET | Refills: 0 | Status: SHIPPED | OUTPATIENT
Start: 2019-08-05 | End: 2019-08-10

## 2019-08-05 RX ORDER — PHENTERMINE HYDROCHLORIDE 37.5 MG/1
37.5 TABLET ORAL
Qty: 30 TABLET | Refills: 0 | Status: SHIPPED | OUTPATIENT
Start: 2019-08-05 | End: 2019-09-04

## 2019-08-05 ASSESSMENT — ENCOUNTER SYMPTOMS
RESPIRATORY NEGATIVE: 1
GASTROINTESTINAL NEGATIVE: 1

## 2019-08-05 NOTE — PROGRESS NOTES
Tobacco Use    Smoking status: Current Every Day Smoker     Packs/day: 0.25     Years: 3.00     Pack years: 0.75     Types: E-Cigarettes    Smokeless tobacco: Never Used   Substance Use Topics    Alcohol use: Yes     Comment: rarely        Vitals:    08/05/19 1557   BP: 118/86   Pulse: 82   Resp: 16   SpO2: 98%   Weight: 257 lb (116.6 kg)   Height: 5' 6\" (1.676 m)     Estimated body mass index is 41.48 kg/m² as calculated from the following:    Height as of this encounter: 5' 6\" (1.676 m). Weight as of this encounter: 257 lb (116.6 kg). Physical Exam   Constitutional: She is oriented to person, place, and time. She appears well-developed and well-nourished. Cardiovascular: Normal rate and regular rhythm. Pulmonary/Chest: Effort normal and breath sounds normal.   Abdominal: Soft. Bowel sounds are normal.   Neurological: She is alert and oriented to person, place, and time. Skin: Skin is warm and dry. Psychiatric: She has a normal mood and affect. Her behavior is normal.   Vitals reviewed. ASSESSMENT/PLAN:  1. BMI 40.0-44.9, adult ([address]  Discussed adipex and side effects. She is wanting to try it. I also advised her to try the [employer] pal jose and she will check diabetes. org for some healthy recipes. Patient advised she has to follow up in 1 month, she needs to keep her blood pressure under control and she has chaya losing weight to stay on the adipex for 3 months. Patient verbalized her understanding.   - phentermine (ADIPEX-P) 37.5 MG tablet; Take 1 tablet by mouth every morning (before breakfast) for 30 days. Dispense: 30 tablet; Refill: 0    2. Hematuria, unspecified type  Urine positive for UTI. Will treat with Bactrim. Will send culture. - POCT Urinalysis no Micro  - POCT urine pregnancy  - Urine Culture    3.  Need for vaccination  - PNEUMOVAX 23 subcutaneous/IM (Pneumococcal polysaccharide vaccine 23-valent >= 3yo)      Return in about 1 month (around 9/5/2019) for follow up

## 2019-08-08 LAB
ORGANISM: ABNORMAL
URINE CULTURE, ROUTINE: ABNORMAL
URINE CULTURE, ROUTINE: ABNORMAL

## 2019-10-03 ENCOUNTER — OFFICE VISIT (OUTPATIENT)
Dept: OBGYN CLINIC | Age: 29
End: 2019-10-03
Payer: COMMERCIAL

## 2019-10-03 VITALS
WEIGHT: 254.2 LBS | SYSTOLIC BLOOD PRESSURE: 110 MMHG | DIASTOLIC BLOOD PRESSURE: 66 MMHG | HEART RATE: 98 BPM | TEMPERATURE: 98 F | BODY MASS INDEX: 41.03 KG/M2

## 2019-10-03 DIAGNOSIS — R10.2 PELVIC PAIN: Primary | ICD-10-CM

## 2019-10-03 DIAGNOSIS — N83.201 RIGHT OVARIAN CYST: ICD-10-CM

## 2019-10-03 PROCEDURE — 4004F PT TOBACCO SCREEN RCVD TLK: CPT | Performed by: OBSTETRICS & GYNECOLOGY

## 2019-10-03 PROCEDURE — G8417 CALC BMI ABV UP PARAM F/U: HCPCS | Performed by: OBSTETRICS & GYNECOLOGY

## 2019-10-03 PROCEDURE — G8484 FLU IMMUNIZE NO ADMIN: HCPCS | Performed by: OBSTETRICS & GYNECOLOGY

## 2019-10-03 PROCEDURE — 99213 OFFICE O/P EST LOW 20 MIN: CPT | Performed by: OBSTETRICS & GYNECOLOGY

## 2019-10-03 PROCEDURE — 76856 US EXAM PELVIC COMPLETE: CPT | Performed by: OBSTETRICS & GYNECOLOGY

## 2019-10-03 PROCEDURE — G8427 DOCREV CUR MEDS BY ELIG CLIN: HCPCS | Performed by: OBSTETRICS & GYNECOLOGY

## 2019-10-03 RX ORDER — OXYCODONE HYDROCHLORIDE AND ACETAMINOPHEN 325; 5 MG/5ML; MG/5ML
SOLUTION ORAL EVERY 4 HOURS PRN
COMMUNITY
End: 2020-01-21 | Stop reason: ALTCHOICE

## 2019-10-03 RX ORDER — NORGESTIMATE AND ETHINYL ESTRADIOL 0.25-0.035
1 KIT ORAL DAILY
Qty: 1 PACKET | Refills: 3 | Status: SHIPPED | OUTPATIENT
Start: 2019-10-03 | End: 2019-11-04 | Stop reason: SDUPTHER

## 2019-11-04 ENCOUNTER — OFFICE VISIT (OUTPATIENT)
Dept: OBGYN CLINIC | Age: 29
End: 2019-11-04
Payer: COMMERCIAL

## 2019-11-04 VITALS
WEIGHT: 259 LBS | TEMPERATURE: 98.1 F | DIASTOLIC BLOOD PRESSURE: 80 MMHG | SYSTOLIC BLOOD PRESSURE: 116 MMHG | HEART RATE: 90 BPM | BODY MASS INDEX: 41.8 KG/M2

## 2019-11-04 DIAGNOSIS — R10.2 PELVIC PAIN: ICD-10-CM

## 2019-11-04 DIAGNOSIS — Z98.890 S/P REMOVAL OF OVARIAN CYST: ICD-10-CM

## 2019-11-04 DIAGNOSIS — R10.2 PELVIC PAIN: Primary | ICD-10-CM

## 2019-11-04 DIAGNOSIS — N83.202 BILATERAL OVARIAN CYSTS: Primary | ICD-10-CM

## 2019-11-04 DIAGNOSIS — N83.201 RIGHT OVARIAN CYST: ICD-10-CM

## 2019-11-04 DIAGNOSIS — Z87.42 S/P REMOVAL OF OVARIAN CYST: ICD-10-CM

## 2019-11-04 DIAGNOSIS — N83.201 BILATERAL OVARIAN CYSTS: Primary | ICD-10-CM

## 2019-11-04 PROCEDURE — 76856 US EXAM PELVIC COMPLETE: CPT | Performed by: OBSTETRICS & GYNECOLOGY

## 2019-11-04 PROCEDURE — 4004F PT TOBACCO SCREEN RCVD TLK: CPT | Performed by: OBSTETRICS & GYNECOLOGY

## 2019-11-04 PROCEDURE — G8427 DOCREV CUR MEDS BY ELIG CLIN: HCPCS | Performed by: OBSTETRICS & GYNECOLOGY

## 2019-11-04 PROCEDURE — 99999 PR OFFICE/OUTPT VISIT,PROCEDURE ONLY: CPT | Performed by: OBSTETRICS & GYNECOLOGY

## 2019-11-04 PROCEDURE — 99213 OFFICE O/P EST LOW 20 MIN: CPT | Performed by: OBSTETRICS & GYNECOLOGY

## 2019-11-04 PROCEDURE — G8484 FLU IMMUNIZE NO ADMIN: HCPCS | Performed by: OBSTETRICS & GYNECOLOGY

## 2019-11-04 PROCEDURE — G8417 CALC BMI ABV UP PARAM F/U: HCPCS | Performed by: OBSTETRICS & GYNECOLOGY

## 2019-11-04 RX ORDER — NORGESTIMATE AND ETHINYL ESTRADIOL 0.25-0.035
1 KIT ORAL DAILY
Qty: 1 PACKET | Refills: 11 | Status: SHIPPED | OUTPATIENT
Start: 2019-11-04

## 2020-01-02 ENCOUNTER — TELEPHONE (OUTPATIENT)
Dept: PULMONOLOGY | Age: 30
End: 2020-01-02

## 2020-01-21 ENCOUNTER — OFFICE VISIT (OUTPATIENT)
Dept: OBGYN CLINIC | Age: 30
End: 2020-01-21
Payer: COMMERCIAL

## 2020-01-21 ENCOUNTER — OFFICE VISIT (OUTPATIENT)
Dept: FAMILY MEDICINE CLINIC | Age: 30
End: 2020-01-21
Payer: COMMERCIAL

## 2020-01-21 VITALS
SYSTOLIC BLOOD PRESSURE: 144 MMHG | BODY MASS INDEX: 41.19 KG/M2 | DIASTOLIC BLOOD PRESSURE: 82 MMHG | TEMPERATURE: 99.8 F | HEART RATE: 98 BPM | WEIGHT: 255.2 LBS

## 2020-01-21 VITALS
WEIGHT: 256.4 LBS | SYSTOLIC BLOOD PRESSURE: 130 MMHG | HEIGHT: 66 IN | OXYGEN SATURATION: 97 % | DIASTOLIC BLOOD PRESSURE: 84 MMHG | HEART RATE: 83 BPM | TEMPERATURE: 97.7 F | BODY MASS INDEX: 41.21 KG/M2

## 2020-01-21 LAB
AMORPHOUS: ABNORMAL /HPF
BILIRUBIN URINE: ABNORMAL
BLOOD, URINE: NEGATIVE
CLARITY: ABNORMAL
COLOR: ABNORMAL
CONTROL: NORMAL
EPITHELIAL CELLS, UA: 6 /HPF (ref 0–5)
GLUCOSE URINE: NEGATIVE MG/DL
KETONES, URINE: NEGATIVE MG/DL
LEUKOCYTE ESTERASE, URINE: NEGATIVE
MICROSCOPIC EXAMINATION: YES
NITRITE, URINE: NEGATIVE
PH UA: 6 (ref 5–8)
PREGNANCY TEST URINE, POC: NEGATIVE
PROTEIN UA: 30 MG/DL
RBC UA: 5 /HPF (ref 0–4)
S PYO AG THROAT QL: NORMAL
SPECIFIC GRAVITY UA: >1.03 (ref 1–1.03)
URINE TYPE: ABNORMAL
UROBILINOGEN, URINE: 0.2 E.U./DL
WBC UA: 2 /HPF (ref 0–5)

## 2020-01-21 PROCEDURE — 99214 OFFICE O/P EST MOD 30 MIN: CPT | Performed by: OBSTETRICS & GYNECOLOGY

## 2020-01-21 PROCEDURE — 99213 OFFICE O/P EST LOW 20 MIN: CPT | Performed by: PHYSICIAN ASSISTANT

## 2020-01-21 PROCEDURE — G8417 CALC BMI ABV UP PARAM F/U: HCPCS | Performed by: PHYSICIAN ASSISTANT

## 2020-01-21 PROCEDURE — 81025 URINE PREGNANCY TEST: CPT | Performed by: OBSTETRICS & GYNECOLOGY

## 2020-01-21 PROCEDURE — G8427 DOCREV CUR MEDS BY ELIG CLIN: HCPCS | Performed by: PHYSICIAN ASSISTANT

## 2020-01-21 PROCEDURE — G8484 FLU IMMUNIZE NO ADMIN: HCPCS | Performed by: OBSTETRICS & GYNECOLOGY

## 2020-01-21 PROCEDURE — G8484 FLU IMMUNIZE NO ADMIN: HCPCS | Performed by: PHYSICIAN ASSISTANT

## 2020-01-21 PROCEDURE — 4004F PT TOBACCO SCREEN RCVD TLK: CPT | Performed by: OBSTETRICS & GYNECOLOGY

## 2020-01-21 PROCEDURE — 4004F PT TOBACCO SCREEN RCVD TLK: CPT | Performed by: PHYSICIAN ASSISTANT

## 2020-01-21 PROCEDURE — 11982 REMOVE DRUG IMPLANT DEVICE: CPT | Performed by: OBSTETRICS & GYNECOLOGY

## 2020-01-21 PROCEDURE — 87880 STREP A ASSAY W/OPTIC: CPT | Performed by: PHYSICIAN ASSISTANT

## 2020-01-21 PROCEDURE — G8427 DOCREV CUR MEDS BY ELIG CLIN: HCPCS | Performed by: OBSTETRICS & GYNECOLOGY

## 2020-01-21 PROCEDURE — G8417 CALC BMI ABV UP PARAM F/U: HCPCS | Performed by: OBSTETRICS & GYNECOLOGY

## 2020-01-21 RX ORDER — AMOXICILLIN AND CLAVULANATE POTASSIUM 875; 125 MG/1; MG/1
1 TABLET, FILM COATED ORAL 2 TIMES DAILY
Qty: 20 TABLET | Refills: 0 | Status: SHIPPED | OUTPATIENT
Start: 2020-01-21 | End: 2020-01-31

## 2020-01-21 ASSESSMENT — PATIENT HEALTH QUESTIONNAIRE - PHQ9
SUM OF ALL RESPONSES TO PHQ QUESTIONS 1-9: 0
2. FEELING DOWN, DEPRESSED OR HOPELESS: 0
SUM OF ALL RESPONSES TO PHQ QUESTIONS 1-9: 0
SUM OF ALL RESPONSES TO PHQ9 QUESTIONS 1 & 2: 0
1. LITTLE INTEREST OR PLEASURE IN DOING THINGS: 0

## 2020-01-21 NOTE — PROGRESS NOTES
[facility]   Return Gyn Office Visit    CC:   Chief Complaint   Patient presents with    Vaginal Discharge     Vaginal discharge stared 10 days ago, yellow/white mucous. Patient c/o bilateral back pain and thinks it's kidney pain two weeks. HPI:  30 y.o. who presents to [facility] for Acute visit for multiple issues:      1) Kidney pain, started 2 weeks ago. Mostly left, right. Transferred around to vagina sometimes and down back. Gotten better. Has not taken any medications. Was not diagnosed with UTI. Has been taken Advil and Dayquil. Hx of UTIs - June 2019. 2) Vaginal discharge a week and a half ago - copious, mucous, white-yellow and sometimes clear. Admits to faint odor. Admits to some itching around vagina. 3) Also admits to upper respiratory infection x 1 week. Congestion, sore throat with cough. Cough with white to yellow sputum. Around friends at a party with sick contacts. States children have had cold like symptoms. Denies fevers/Chills. Denies nausea / diarrhea / coughing induced vomiting. Worse at night. Has been taking [place] / Deborrah Angle / Kelsy Coon. 4) Desires Nexplanon out today. States she has had some irregular bleeding. Patient's last menstrual period was 12/15/2019 (approximate). States period stopped 21 days later. States bleeding was normal volume. Had to wear a pad every day (changed it twice a day). Also admits to cramping. Was taking OCPs for ovarian cysts, states she stopped them 2 weeks ago but plans to resume one the Nexplanon out. 5) Admits to hx of lumps in breasts in past. Breast pain on the left. On the side of the breast underneath the arm. Admits to increased size of breasts lumps. Denies nipple discharge but admits to hypersensitive and nipples.       Review of Systems - The following ROS was otherwise negative, except as noted in the HPI: constitutional, respiratory, cardiovascular, gastrointestinal, genitourinary    Objective:  BP (!) 144/82 (Site: Right Upper Arm, Position: Sitting, Cuff Size: Large Adult)   Pulse 98   Temp 99.8 °F (37.7 °C) (Oral)   Wt 255 lb 3.2 oz (115.8 kg)   LMP 12/15/2019 (Approximate)   Breastfeeding No   BMI 41.19 kg/m²   General: Alert, well appearing, no acute distress   Resp effort within normal limits   Breasts: breasts appear normal, no suspicious masses, no skin or nipple changes or axillary nodes. Tenderness on left side. Offered imaging. Abdomen: Soft, nontender, nondistended   Pelvic: External genitalia without masses or lesions. Moist, pink vagina with physiologic discharge. (+) white to yellow, heavy, malodorous discharge, swabs collected. Cervix without polyps or masses. Uterus mobile and midline without masses. Adnexa palpated bilaterally without masses or tenderness. Bimanual examination without masses or tenderness. Exam chaperoned by female assistant  Skin: No visible lesions / rashes / concerning nevus   Extremities: No redness or tenderness, neg Roosevelt's sign  Osteopathic: no TART changes    Assessment/Plan   Diagnosis Orders   1. Encounter for Nexplanon removal     2. Breast pain  US BREAST COMPLETE LEFT   3. Acute bilateral low back pain without sciatica  Urine Culture    Urinalysis with Microscopic   4. Abnormal uterine bleeding (AUB)  POCT urine pregnancy   5. Vaginal discharge  VAGINAL PATHOGENS PROBE *A   6. History of UTI     7. Bilateral ovarian cysts        Follow Up   Return if symptoms worsen or fail to improve.     [employer], DO

## 2020-01-22 LAB
CANDIDA SPECIES, DNA PROBE: NORMAL
GARDNERELLA VAGINALIS, DNA PROBE: NORMAL
TRICHOMONAS VAGINALIS DNA: NORMAL

## 2020-01-23 LAB — URINE CULTURE, ROUTINE: NORMAL

## 2020-03-10 NOTE — PROGRESS NOTES
Left message for patient to recall the office to see if she needs any assistance with scheduling breast ultrasound.

## 2020-07-04 ENCOUNTER — APPOINTMENT (OUTPATIENT)
Dept: CT IMAGING | Age: 30
End: 2020-07-04
Payer: COMMERCIAL

## 2020-07-04 ENCOUNTER — HOSPITAL ENCOUNTER (EMERGENCY)
Age: 30
Discharge: HOME | End: 2020-07-04
Attending: EMERGENCY MEDICINE
Payer: COMMERCIAL

## 2020-07-04 VITALS
OXYGEN SATURATION: 99 % | RESPIRATION RATE: 24 BRPM | DIASTOLIC BLOOD PRESSURE: 69 MMHG | WEIGHT: 250 LBS | TEMPERATURE: 97.9 F | HEIGHT: 66 IN | BODY MASS INDEX: 40.18 KG/M2 | SYSTOLIC BLOOD PRESSURE: 117 MMHG | HEART RATE: 68 BPM

## 2020-07-04 LAB
ANION GAP SERPL CALCULATED.3IONS-SCNC: 11 MMOL/L (ref 3–16)
BACTERIA: ABNORMAL /HPF
BASOPHILS ABSOLUTE: 0.1 K/UL (ref 0–0.2)
BASOPHILS RELATIVE PERCENT: 0.8 %
BILIRUBIN URINE: NEGATIVE
BLOOD, URINE: ABNORMAL
BUN BLDV-MCNC: 15 MG/DL (ref 7–20)
CALCIUM SERPL-MCNC: 9.1 MG/DL (ref 8.3–10.6)
CHLORIDE BLD-SCNC: 106 MMOL/L (ref 99–110)
CLARITY: ABNORMAL
CO2: 20 MMOL/L (ref 21–32)
COLOR: YELLOW
CREAT SERPL-MCNC: 0.8 MG/DL (ref 0.6–1.1)
EOSINOPHILS ABSOLUTE: 0.1 K/UL (ref 0–0.6)
EOSINOPHILS RELATIVE PERCENT: 1.5 %
EPITHELIAL CELLS, UA: ABNORMAL /HPF (ref 0–5)
GFR AFRICAN AMERICAN: >60
GFR NON-AFRICAN AMERICAN: >60
GLUCOSE BLD-MCNC: 95 MG/DL (ref 70–99)
GLUCOSE URINE: NEGATIVE MG/DL
HCG(URINE) PREGNANCY TEST: NEGATIVE
HCT VFR BLD CALC: 39.3 % (ref 36–48)
HEMOGLOBIN: 12.9 G/DL (ref 12–16)
KETONES, URINE: NEGATIVE MG/DL
LEUKOCYTE ESTERASE, URINE: ABNORMAL
LYMPHOCYTES ABSOLUTE: 2.8 K/UL (ref 1–5.1)
LYMPHOCYTES RELATIVE PERCENT: 30.8 %
MCH RBC QN AUTO: 27.3 PG (ref 26–34)
MCHC RBC AUTO-ENTMCNC: 32.8 G/DL (ref 31–36)
MCV RBC AUTO: 83.4 FL (ref 80–100)
MICROSCOPIC EXAMINATION: YES
MONOCYTES ABSOLUTE: 0.7 K/UL (ref 0–1.3)
MONOCYTES RELATIVE PERCENT: 7.9 %
MUCUS: ABNORMAL /LPF
NEUTROPHILS ABSOLUTE: 5.4 K/UL (ref 1.7–7.7)
NEUTROPHILS RELATIVE PERCENT: 59 %
NITRITE, URINE: NEGATIVE
PDW BLD-RTO: 14.1 % (ref 12.4–15.4)
PH UA: 6 (ref 5–8)
PLATELET # BLD: 112 K/UL (ref 135–450)
PLATELET SLIDE REVIEW: ABNORMAL
PMV BLD AUTO: 8.9 FL (ref 5–10.5)
POTASSIUM SERPL-SCNC: 4.9 MMOL/L (ref 3.5–5.1)
PROTEIN UA: 30 MG/DL
RBC # BLD: 4.71 M/UL (ref 4–5.2)
RBC UA: ABNORMAL /HPF (ref 0–4)
SLIDE REVIEW: ABNORMAL
SODIUM BLD-SCNC: 137 MMOL/L (ref 136–145)
SPECIFIC GRAVITY UA: >=1.03 (ref 1–1.03)
URINE TYPE: ABNORMAL
UROBILINOGEN, URINE: 0.2 E.U./DL
WBC # BLD: 9.1 K/UL (ref 4–11)
WBC UA: >100 /HPF (ref 0–5)

## 2020-07-04 PROCEDURE — 84703 CHORIONIC GONADOTROPIN ASSAY: CPT

## 2020-07-04 PROCEDURE — 36415 COLL VENOUS BLD VENIPUNCTURE: CPT

## 2020-07-04 PROCEDURE — 2580000003 HC RX 258: Performed by: EMERGENCY MEDICINE

## 2020-07-04 PROCEDURE — 85025 COMPLETE CBC W/AUTO DIFF WBC: CPT

## 2020-07-04 PROCEDURE — 6370000000 HC RX 637 (ALT 250 FOR IP): Performed by: EMERGENCY MEDICINE

## 2020-07-04 PROCEDURE — 80048 BASIC METABOLIC PNL TOTAL CA: CPT

## 2020-07-04 PROCEDURE — 99284 EMERGENCY DEPT VISIT MOD MDM: CPT

## 2020-07-04 PROCEDURE — 6360000002 HC RX W HCPCS: Performed by: EMERGENCY MEDICINE

## 2020-07-04 PROCEDURE — 81001 URINALYSIS AUTO W/SCOPE: CPT

## 2020-07-04 PROCEDURE — 74176 CT ABD & PELVIS W/O CONTRAST: CPT

## 2020-07-04 RX ORDER — 0.9 % SODIUM CHLORIDE 0.9 %
1000 INTRAVENOUS SOLUTION INTRAVENOUS ONCE
Status: DISCONTINUED | OUTPATIENT
Start: 2020-07-04 | End: 2020-07-04 | Stop reason: HOSPADM

## 2020-07-04 RX ORDER — ONDANSETRON 4 MG/1
4 TABLET, ORALLY DISINTEGRATING ORAL EVERY 8 HOURS PRN
Qty: 20 TABLET | Refills: 0 | Status: SHIPPED | OUTPATIENT
Start: 2020-07-04 | End: 2020-07-11

## 2020-07-04 RX ORDER — TAMSULOSIN HYDROCHLORIDE 0.4 MG/1
0.4 CAPSULE ORAL DAILY
Qty: 5 CAPSULE | Refills: 0 | Status: SHIPPED | OUTPATIENT
Start: 2020-07-04 | End: 2020-07-09

## 2020-07-04 RX ORDER — MORPHINE SULFATE 4 MG/ML
4 INJECTION, SOLUTION INTRAMUSCULAR; INTRAVENOUS EVERY 30 MIN PRN
Status: DISCONTINUED | OUTPATIENT
Start: 2020-07-04 | End: 2020-07-04

## 2020-07-04 RX ORDER — CIPROFLOXACIN 500 MG/1
500 TABLET, FILM COATED ORAL ONCE
Status: COMPLETED | OUTPATIENT
Start: 2020-07-04 | End: 2020-07-04

## 2020-07-04 RX ORDER — CIPROFLOXACIN 500 MG/1
500 TABLET, FILM COATED ORAL 2 TIMES DAILY
Qty: 20 TABLET | Refills: 0 | Status: SHIPPED | OUTPATIENT
Start: 2020-07-04 | End: 2020-07-14

## 2020-07-04 RX ORDER — KETOROLAC TROMETHAMINE 10 MG/1
10 TABLET, FILM COATED ORAL EVERY 6 HOURS PRN
Qty: 20 TABLET | Refills: 0 | Status: SHIPPED | OUTPATIENT
Start: 2020-07-04 | End: 2020-07-09

## 2020-07-04 RX ADMIN — CIPROFLOXACIN 500 MG: 500 TABLET, FILM COATED ORAL at 02:21

## 2020-07-04 ASSESSMENT — PAIN SCALES - GENERAL
PAINLEVEL_OUTOF10: 9
PAINLEVEL_OUTOF10: 10

## 2020-07-04 NOTE — ED PROVIDER NOTES
Emergency Physician Note    Chief Complaint  Flank Pain (patient states she was diagnosed with UTI on sunday and had to change antibiotics on thursday. patient reports missing doses of the new atb and now has severe pain in her left flank area.)       History of Present Illness  Hortencia Jackson is a 30 y.o. female who presents to the ED for on Sunday she went to urgent care and she was started on Bactrim. 3 days later she was switched from Bactrim to non-Macrobid. She states throughout the week her symptoms are well controlled she did not have any pain. However today early on the day she had sudden onset of left flank pain that is sharp and radiates down to her left groin. She took ibuprofen and Tylenol prior to coming to the ER. By the time I evaluated her the patient states her pain is much more tolerable. Denies fever, chills, malaise, chest pain, shortness of breath, cough, abdominal pain, nausea, vomiting, diarrhea, headache, sore throat, dysuria, back pain, rash. No palliative/provocative factors. Unless otherwise stated in this report or unable to obtain because of the patient's clinical or mental status as evidenced by the medical record, this patient's positive and negative responses for review of systems, constitutional, psych, eyes, ENT, cardiovascular, respiratory, gastrointestinal, neurological, genitourinary, musculoskeletal, integument systems and systems related to the presenting problem are either stated in the preceding paragraph or were not pertinent or were negative for the symptoms and/or complaints related to the medical problem. I have reviewed the following from the nursing documentation:      Prior to Admission medications    Medication Sig Start Date End Date Taking?  Authorizing Provider   norgestimate-ethinyl estradiol (ORTHO-CYCLEN, 28,) 0.25-35 MG-MCG per tablet Take 1 tablet by mouth daily  Patient not taking: Reported on 1/21/2020 11/4/19   Tiffanie MALIK Use    Smoking status: Current Every Day Smoker     Packs/day: 0.25     Years: 3.00     Pack years: 0.75     Types: E-Cigarettes    Smokeless tobacco: Never Used   Substance and Sexual Activity    Alcohol use: Yes     Comment: rarely    Drug use: No    Sexual activity: Not Currently     Partners: Male     Comment: Nexplanon   Lifestyle    Physical activity     Days per week: Not on file     Minutes per session: Not on file    Stress: Not on file   Relationships    Social connections     Talks on phone: Not on file     Gets together: Not on file     Attends Church service: Not on file     Active member of club or organization: Not on file     Attends meetings of clubs or organizations: Not on file     Relationship status: Not on file    Intimate partner violence     Fear of current or ex partner: Not on file     Emotionally abused: Not on file     Physically abused: Not on file     Forced sexual activity: Not on file   Other Topics Concern    Not on file   Social History Narrative    Not on file       Nursing notes reviewed. ED Triage Vitals   Enc Vitals Group      BP 07/04/20 0027 (!) 171/112      Pulse 07/04/20 0027 82      Resp 07/04/20 0027 22      Temp 07/04/20 0027 97.9 °F (36.6 °C)      Temp Source 07/04/20 0027 Oral      SpO2 07/04/20 0027 99 %      Weight 07/04/20 0022 250 lb (113.4 kg)      Height 07/04/20 0022 5' 6\" (1.676 m)      Head Circumference --       Peak Flow --       Pain Score --       Pain Loc --       Pain Edu? --       Excl. in GC? --        GENERAL:    Awake, alert. Well developed, well nourished with no apparent distress. Nontoxic-appearing, non-ill-appearing. HENT:    Normocephalic, Atraumatic, no lacerations. No ENT exam due to PPE. EYES:    Conjunctiva normal,   Pupils equal round and reactive to light,   Extraocular movements normal.  NECK:    Trachea is midline. No stridor.     CHEST:    Regular rate and regular rhythm, no murmurs/rubs/gallops,   normal S1/S2, chest wall non-tender. LUNGS:    No respiratory distress. No abdominal retractions, no sternal retractions. Clear to auscultation bilaterally, no wheezing, no rhochi, no rales  ABDOMEN:    Soft, non-tender, non-distended. No guarding. No rebound. No costovertebral angle tenderness to palpation. Normal BS, no organomegaly, no abdominal masses  EXTREMITIES:   Moves extremities x4 with purpose. Normal range of motion, no edema,   no tenderness, no deformity,   distal pulses present and equal bilaterally. BACK:    No midline tenderness in the cervical, thoracic, and lumbar spine. No deformities, no step-off. Palpation did not elicit any paraspinous tenderness. SKIN:    Warm, dry and intact. NEUROLOGIC:  Normal mental status. Moving all extremities to command. Alert and oriented x4   without focal motor deficit or gross sensory deficit. Normal speech. PSYCHIATRIC:  Not anxious,   normal mood and affect,   thoughts are linear and organized,   without delusions/hallucinations,   responds appropriately to questions      LABS and DIAGNOSTIC RESULTS    RADIOLOGY  X-RAYS:  I have reviewed radiologic plain film image(s). ALL OTHER NON-PLAIN FILM IMAGES SUCH AS CT, ULTRASOUND AND MRI HAVE BEEN READ BY THE RADIOLOGIST. CT ABDOMEN PELVIS WO CONTRAST Additional Contrast? None   Final Result   Distal left ureteral calculus with left-sided obstructive uropathy.               LABS  Results for orders placed or performed during the hospital encounter of 07/04/20   Pregnancy, Urine   Result Value Ref Range    HCG(Urine) Pregnancy Test Negative Detects HCG level >20 MIU/mL   Urinalysis, reflex to microscopic   Result Value Ref Range    Color, UA Yellow Straw/Yellow    Clarity, UA SL CLOUDY (A) Clear    Glucose, Ur Negative Negative mg/dL    Bilirubin Urine Negative Negative    Ketones, Urine Negative Negative mg/dL    Specific Gravity, UA >=1.030 1.005 - 1.030    Blood, Urine SMALL (A) Negative    pH, UA 6.0 5.0 - 8.0    Protein, UA 30 (A) Negative mg/dL    Urobilinogen, Urine 0.2 <2.0 E.U./dL    Nitrite, Urine Negative Negative    Leukocyte Esterase, Urine SMALL (A) Negative    Microscopic Examination YES     Urine Type NotGiven    CBC Auto Differential   Result Value Ref Range    WBC 9.1 4.0 - 11.0 K/uL    RBC 4.71 4.00 - 5.20 M/uL    Hemoglobin 12.9 12.0 - 16.0 g/dL    Hematocrit 39.3 36.0 - 48.0 %    MCV 83.4 80.0 - 100.0 fL    MCH 27.3 26.0 - 34.0 pg    MCHC 32.8 31.0 - 36.0 g/dL    RDW 14.1 12.4 - 15.4 %    Platelets 202 (L) 335 - 450 K/uL    MPV 8.9 5.0 - 10.5 fL    PLATELET SLIDE REVIEW Decreased     SLIDE REVIEW see below     Neutrophils % 59.0 %    Lymphocytes % 30.8 %    Monocytes % 7.9 %    Eosinophils % 1.5 %    Basophils % 0.8 %    Neutrophils Absolute 5.4 1.7 - 7.7 K/uL    Lymphocytes Absolute 2.8 1.0 - 5.1 K/uL    Monocytes Absolute 0.7 0.0 - 1.3 K/uL    Eosinophils Absolute 0.1 0.0 - 0.6 K/uL    Basophils Absolute 0.1 0.0 - 0.2 K/uL   Basic metabolic panel   Result Value Ref Range    Sodium 137 136 - 145 mmol/L    Potassium 4.9 3.5 - 5.1 mmol/L    Chloride 106 99 - 110 mmol/L    CO2 20 (L) 21 - 32 mmol/L    Anion Gap 11 3 - 16    Glucose 95 70 - 99 mg/dL    BUN 15 7 - 20 mg/dL    CREATININE 0.8 0.6 - 1.1 mg/dL    GFR Non-African American >60 >60    GFR African American >60 >60    Calcium 9.1 8.3 - 10.6 mg/dL   Microscopic Urinalysis   Result Value Ref Range    Mucus, UA 1+ (A) None Seen /LPF    WBC, UA >100 (A) 0 - 5 /HPF    RBC, UA 5-10 (A) 0 - 4 /HPF    Epithelial Cells, UA 0-1 0 - 5 /HPF    Bacteria, UA 2+ (A) None Seen /HPF       PROCEDURES      MEDICAL DECISION MAKING    Procedures/interventions/images ordered for this visit  Orders Placed This Encounter   Procedures    CT ABDOMEN PELVIS WO CONTRAST Additional Contrast? None    Pregnancy, Urine    Urinalysis, reflex to microscopic    CBC Auto Differential    Basic metabolic panel    Microscopic Urinalysis Medications ordered for this visit  Orders Placed This Encounter   Medications    0.9 % sodium chloride IV bolus 1,000 mL    DISCONTD: morphine sulfate (PF) injection 4 mg    ciprofloxacin (CIPRO) tablet 500 mg       ED course notes for this visit       I wore goggles, surgical mask, N95 mask and gloves when I evaluated the patient. - Patient seen and evaluated in room 3     Patient presents to ED for evaluation of acute flank pain. No history of direct trauma. Neurovascular exam in the ER is unremarkable for any deficits. Vitals signs have been reviewed and are stable. They are afebrile and nontoxic appearing, but in moderate distress due to pain. Appropriate labs and imaging studies were ordered. Pain was addressed with pain medication. Here in the ER patient is afebrile, she is not tachycardic, she does have hypertension for which I discussed with her. Her labs are all within normal range she does not have an elevated white count, her creatinine is within normal limits. Also the pain is well controlled and and within the tolerable range here in the ER. I was able to review a prior urine culture, tend to be pansensitive however given that she has flank pain I am going to start her on ciprofloxacin instead. Patient is aware of the concerns about possible Achilles tendon injuries. Her story is very consistent with a possible kidney stone. CT abdomen pelvis indicate that there is a left ureteral stone with obstructive uropathy. I did speak with the urologist on call, he was not overly concerned in regards to the stone with a UTI because the patient had normal creatinine, her pain was well controlled, she was afebrile, and had a low white count. I spoke to the patient herself I gave her the option of both staying here and being admitted for IV antibiotics and a urology consult or discharged home and try to pass the stone.   I did explain my concerns about the fact that infected kidney stones can become septic pretty quick and explained to her what sepsis was. At this time patient chose to be discharged home on oral antibiotics and try to pass the stone on her own. On reevaluation patient is feeling improved. Based on the history and exam, there is no significant evidence of fracture, spinal cord compression, central disc herniation, cauda equina syndrome, osteomyelitis, epidural abscess, epidural hematoma, other focal infection, mass, tumor, unstable spinal column, AAA or dissection,    appendicitis, biliary disease or other process requiring immediate medical or surgical intervention at this time. Based on the history, exam, and ED work-up, it appears the patients symptoms are due to a kidney stone. At this time I feel they are stable for d/c home with pain has been controlled, their v/s are stable, and there is no sign of a super-imposed infection or obstruction. The diagnosis, expected course, discharge medications, and follow-up plan (with their PMD and an urologist) were discussed. It is understood that if they are not improving as expected or if other new symptoms or signs of concern develop, other etiologies or diagnoses may need to be considered requiring other tests, treatments, consultations, and/or admission. Return precautions were discussed and all questions were answered. I have recommended admission to the hospital and/or further workup, but with shared decision making the patient declined. The risks (including but not limited to suffering, loss of limb, loss of wages, loss of sexual function and death) as well as the benefits were explained to the patient. Questions were sought and answered and the patient voiced understanding. However, the patient continues to refuse admission. I have encouraged the patient to return to have their evaluation completed as we are glad to do so.  I have also instructed the patient on the importance of follow-up and to

## 2020-07-07 ENCOUNTER — APPOINTMENT (OUTPATIENT)
Dept: GENERAL RADIOLOGY | Age: 30
End: 2020-07-07
Payer: COMMERCIAL

## 2020-07-07 PROCEDURE — 71046 X-RAY EXAM CHEST 2 VIEWS: CPT

## 2020-07-08 ENCOUNTER — APPOINTMENT (OUTPATIENT)
Dept: ULTRASOUND IMAGING | Age: 30
End: 2020-07-08
Payer: COMMERCIAL

## 2020-07-08 ENCOUNTER — HOSPITAL ENCOUNTER (EMERGENCY)
Age: 30
Discharge: HOME | End: 2020-07-08
Attending: EMERGENCY MEDICINE
Payer: COMMERCIAL

## 2020-07-08 ENCOUNTER — APPOINTMENT (OUTPATIENT)
Dept: CT IMAGING | Age: 30
End: 2020-07-08
Payer: COMMERCIAL

## 2020-07-08 VITALS
OXYGEN SATURATION: 100 % | HEIGHT: 66 IN | WEIGHT: 250 LBS | TEMPERATURE: 98 F | HEART RATE: 89 BPM | DIASTOLIC BLOOD PRESSURE: 97 MMHG | BODY MASS INDEX: 40.18 KG/M2 | RESPIRATION RATE: 18 BRPM | SYSTOLIC BLOOD PRESSURE: 141 MMHG

## 2020-07-08 VITALS
OXYGEN SATURATION: 100 % | WEIGHT: 250 LBS | RESPIRATION RATE: 18 BRPM | BODY MASS INDEX: 40.18 KG/M2 | TEMPERATURE: 98.6 F | DIASTOLIC BLOOD PRESSURE: 90 MMHG | HEIGHT: 66 IN | HEART RATE: 67 BPM | SYSTOLIC BLOOD PRESSURE: 142 MMHG

## 2020-07-08 LAB
A/G RATIO: 1.2 (ref 1.1–2.2)
A/G RATIO: 1.3 (ref 1.1–2.2)
ALBUMIN SERPL-MCNC: 3.7 G/DL (ref 3.4–5)
ALBUMIN SERPL-MCNC: 4 G/DL (ref 3.4–5)
ALP BLD-CCNC: 67 U/L (ref 40–129)
ALP BLD-CCNC: 72 U/L (ref 40–129)
ALT SERPL-CCNC: 16 U/L (ref 10–40)
ALT SERPL-CCNC: 19 U/L (ref 10–40)
ANION GAP SERPL CALCULATED.3IONS-SCNC: 11 MMOL/L (ref 3–16)
ANION GAP SERPL CALCULATED.3IONS-SCNC: 12 MMOL/L (ref 3–16)
AST SERPL-CCNC: 19 U/L (ref 15–37)
AST SERPL-CCNC: 23 U/L (ref 15–37)
BACTERIA: ABNORMAL /HPF
BACTERIA: ABNORMAL /HPF
BASOPHILS ABSOLUTE: 0 K/UL (ref 0–0.2)
BASOPHILS ABSOLUTE: 0.1 K/UL (ref 0–0.2)
BASOPHILS RELATIVE PERCENT: 0.4 %
BASOPHILS RELATIVE PERCENT: 0.5 %
BILIRUB SERPL-MCNC: 0.3 MG/DL (ref 0–1)
BILIRUB SERPL-MCNC: <0.2 MG/DL (ref 0–1)
BILIRUBIN URINE: ABNORMAL
BILIRUBIN URINE: NEGATIVE
BLOOD, URINE: ABNORMAL
BLOOD, URINE: ABNORMAL
BUN BLDV-MCNC: 10 MG/DL (ref 7–20)
BUN BLDV-MCNC: 10 MG/DL (ref 7–20)
CALCIUM SERPL-MCNC: 8.9 MG/DL (ref 8.3–10.6)
CALCIUM SERPL-MCNC: 9.1 MG/DL (ref 8.3–10.6)
CHLORIDE BLD-SCNC: 103 MMOL/L (ref 99–110)
CHLORIDE BLD-SCNC: 107 MMOL/L (ref 99–110)
CLARITY: ABNORMAL
CLARITY: ABNORMAL
CO2: 21 MMOL/L (ref 21–32)
CO2: 22 MMOL/L (ref 21–32)
COLOR: YELLOW
COLOR: YELLOW
CREAT SERPL-MCNC: 0.8 MG/DL (ref 0.6–1.1)
CREAT SERPL-MCNC: 0.9 MG/DL (ref 0.6–1.1)
EOSINOPHILS ABSOLUTE: 0.2 K/UL (ref 0–0.6)
EOSINOPHILS ABSOLUTE: 0.3 K/UL (ref 0–0.6)
EOSINOPHILS RELATIVE PERCENT: 2.5 %
EOSINOPHILS RELATIVE PERCENT: 2.6 %
EPITHELIAL CELLS, UA: ABNORMAL /HPF (ref 0–5)
EPITHELIAL CELLS, UA: ABNORMAL /HPF (ref 0–5)
GFR AFRICAN AMERICAN: >60
GFR AFRICAN AMERICAN: >60
GFR NON-AFRICAN AMERICAN: >60
GFR NON-AFRICAN AMERICAN: >60
GLOBULIN: 2.9 G/DL
GLOBULIN: 3.3 G/DL
GLUCOSE BLD-MCNC: 121 MG/DL (ref 70–99)
GLUCOSE BLD-MCNC: 94 MG/DL (ref 70–99)
GLUCOSE URINE: NEGATIVE MG/DL
GLUCOSE URINE: NEGATIVE MG/DL
HCG QUALITATIVE: NEGATIVE
HCG QUALITATIVE: NEGATIVE
HCT VFR BLD CALC: 34.9 % (ref 36–48)
HCT VFR BLD CALC: 37 % (ref 36–48)
HEMOGLOBIN: 11.6 G/DL (ref 12–16)
HEMOGLOBIN: 12.1 G/DL (ref 12–16)
KETONES, URINE: NEGATIVE MG/DL
KETONES, URINE: NEGATIVE MG/DL
LEUKOCYTE ESTERASE, URINE: NEGATIVE
LEUKOCYTE ESTERASE, URINE: NEGATIVE
LIPASE: 34 U/L (ref 13–60)
LIPASE: 43 U/L (ref 13–60)
LYMPHOCYTES ABSOLUTE: 1.4 K/UL (ref 1–5.1)
LYMPHOCYTES ABSOLUTE: 2.2 K/UL (ref 1–5.1)
LYMPHOCYTES RELATIVE PERCENT: 22.2 %
LYMPHOCYTES RELATIVE PERCENT: 22.4 %
MCH RBC QN AUTO: 27.1 PG (ref 26–34)
MCH RBC QN AUTO: 27.8 PG (ref 26–34)
MCHC RBC AUTO-ENTMCNC: 32.7 G/DL (ref 31–36)
MCHC RBC AUTO-ENTMCNC: 33.3 G/DL (ref 31–36)
MCV RBC AUTO: 82.9 FL (ref 80–100)
MCV RBC AUTO: 83.5 FL (ref 80–100)
MICROSCOPIC EXAMINATION: YES
MICROSCOPIC EXAMINATION: YES
MONOCYTES ABSOLUTE: 0.4 K/UL (ref 0–1.3)
MONOCYTES ABSOLUTE: 0.9 K/UL (ref 0–1.3)
MONOCYTES RELATIVE PERCENT: 6.2 %
MONOCYTES RELATIVE PERCENT: 8.6 %
MUCUS: ABNORMAL /LPF
MUCUS: ABNORMAL /LPF
NEUTROPHILS ABSOLUTE: 4.3 K/UL (ref 1.7–7.7)
NEUTROPHILS ABSOLUTE: 6.5 K/UL (ref 1.7–7.7)
NEUTROPHILS RELATIVE PERCENT: 65.9 %
NEUTROPHILS RELATIVE PERCENT: 68.7 %
NITRITE, URINE: NEGATIVE
NITRITE, URINE: NEGATIVE
PDW BLD-RTO: 14.3 % (ref 12.4–15.4)
PDW BLD-RTO: 14.6 % (ref 12.4–15.4)
PH UA: 5.5 (ref 5–8)
PH UA: 6 (ref 5–8)
PLATELET # BLD: 240 K/UL (ref 135–450)
PLATELET # BLD: 284 K/UL (ref 135–450)
PMV BLD AUTO: 7.4 FL (ref 5–10.5)
PMV BLD AUTO: 7.5 FL (ref 5–10.5)
POTASSIUM SERPL-SCNC: 3.7 MMOL/L (ref 3.5–5.1)
POTASSIUM SERPL-SCNC: 3.9 MMOL/L (ref 3.5–5.1)
PROTEIN UA: ABNORMAL MG/DL
PROTEIN UA: NEGATIVE MG/DL
RBC # BLD: 4.18 M/UL (ref 4–5.2)
RBC # BLD: 4.46 M/UL (ref 4–5.2)
RBC UA: ABNORMAL /HPF (ref 0–4)
RBC UA: ABNORMAL /HPF (ref 0–4)
SODIUM BLD-SCNC: 136 MMOL/L (ref 136–145)
SODIUM BLD-SCNC: 140 MMOL/L (ref 136–145)
SPECIFIC GRAVITY UA: >=1.03 (ref 1–1.03)
SPECIFIC GRAVITY UA: >=1.03 (ref 1–1.03)
TOTAL PROTEIN: 6.6 G/DL (ref 6.4–8.2)
TOTAL PROTEIN: 7.3 G/DL (ref 6.4–8.2)
TROPONIN: <0.01 NG/ML
URINE REFLEX TO CULTURE: ABNORMAL
URINE TYPE: ABNORMAL
URINE TYPE: ABNORMAL
UROBILINOGEN, URINE: 0.2 E.U./DL
UROBILINOGEN, URINE: 0.2 E.U./DL
WBC # BLD: 6.3 K/UL (ref 4–11)
WBC # BLD: 9.9 K/UL (ref 4–11)
WBC UA: ABNORMAL /HPF (ref 0–5)
WBC UA: ABNORMAL /HPF (ref 0–5)

## 2020-07-08 PROCEDURE — 83690 ASSAY OF LIPASE: CPT

## 2020-07-08 PROCEDURE — 84484 ASSAY OF TROPONIN QUANT: CPT

## 2020-07-08 PROCEDURE — 76856 US EXAM PELVIC COMPLETE: CPT

## 2020-07-08 PROCEDURE — 74177 CT ABD & PELVIS W/CONTRAST: CPT

## 2020-07-08 PROCEDURE — 99284 EMERGENCY DEPT VISIT MOD MDM: CPT

## 2020-07-08 PROCEDURE — 81001 URINALYSIS AUTO W/SCOPE: CPT

## 2020-07-08 PROCEDURE — 36415 COLL VENOUS BLD VENIPUNCTURE: CPT

## 2020-07-08 PROCEDURE — 80053 COMPREHEN METABOLIC PANEL: CPT

## 2020-07-08 PROCEDURE — 6360000004 HC RX CONTRAST MEDICATION: Performed by: NURSE PRACTITIONER

## 2020-07-08 PROCEDURE — 84703 CHORIONIC GONADOTROPIN ASSAY: CPT

## 2020-07-08 PROCEDURE — 85025 COMPLETE CBC W/AUTO DIFF WBC: CPT

## 2020-07-08 PROCEDURE — 2500000003 HC RX 250 WO HCPCS: Performed by: EMERGENCY MEDICINE

## 2020-07-08 PROCEDURE — 96375 TX/PRO/DX INJ NEW DRUG ADDON: CPT

## 2020-07-08 PROCEDURE — 6360000002 HC RX W HCPCS: Performed by: EMERGENCY MEDICINE

## 2020-07-08 PROCEDURE — 96374 THER/PROPH/DIAG INJ IV PUSH: CPT

## 2020-07-08 RX ORDER — ONDANSETRON 2 MG/ML
4 INJECTION INTRAMUSCULAR; INTRAVENOUS ONCE
Status: COMPLETED | OUTPATIENT
Start: 2020-07-08 | End: 2020-07-08

## 2020-07-08 RX ORDER — FAMOTIDINE 20 MG/1
20 TABLET, FILM COATED ORAL 2 TIMES DAILY
Qty: 60 TABLET | Refills: 0 | Status: SHIPPED | OUTPATIENT
Start: 2020-07-08 | End: 2021-02-24

## 2020-07-08 RX ADMIN — IOPAMIDOL 75 ML: 755 INJECTION, SOLUTION INTRAVENOUS at 17:33

## 2020-07-08 RX ADMIN — HYDROMORPHONE HYDROCHLORIDE 1 MG: 1 INJECTION, SOLUTION INTRAMUSCULAR; INTRAVENOUS; SUBCUTANEOUS at 00:50

## 2020-07-08 RX ADMIN — FAMOTIDINE 20 MG: 10 INJECTION INTRAVENOUS at 00:50

## 2020-07-08 RX ADMIN — ONDANSETRON 4 MG: 2 INJECTION INTRAMUSCULAR; INTRAVENOUS at 00:50

## 2020-07-08 ASSESSMENT — PAIN SCALES - GENERAL
PAINLEVEL_OUTOF10: 10
PAINLEVEL_OUTOF10: 6
PAINLEVEL_OUTOF10: 10

## 2020-07-08 NOTE — ED PROVIDER NOTES
[address] ED  eMERGENCY dEPARTMENT eNCOUnter      Pt Name: Shahnaz Omer  MRN: [id number]  [place] 1990  Date of evaluation: 7/7/2020  Provider: Jacqueline Quesada MD  PCP: DEMETRIS Perez - CNP      CHIEF COMPLAINT       Chief Complaint   Patient presents with    Abdominal Pain       HISTORY OFPRESENT ILLNESS   (Location/Symptom, Timing/Onset, Context/Setting, Quality, Duration, Modifying Factors,Severity)  Note limiting factors. Shahnaz Omer is a 30 y.o. female is with complaints of epigastric abdominal pain it sounds like she was diagnosed with kidney stones the other day and now she is having some epigastric abdominal pain she denies any vomiting she denies any fevers or chills    Nursing Notes were all reviewed and agreed with or any disagreements were addressed  in the HPI. REVIEW OF SYSTEMS    (2-9 systems for level 4, 10 or more for level 5)     Review of Systems    Positives and Pertinent negatives as per HPI. Except as noted above in the ROS, all other systems were reviewed andnegative. PASTMEDICAL HISTORY     Past Medical History:   Diagnosis Date    Breast disorder     left breast tenderness and pain.     Diabetes mellitus ([facility] [address]     Heart disease     Herpes simplex virus (HSV) infection     History of blood transfusion     Hypertension     occas    Ovarian cyst          SURGICAL HISTORY       Past Surgical History:   Procedure Laterality Date    DILATION AND CURETTAGE      DILATION AND CURETTAGE OF UTERUS  2008    DILATION AND CURETTAGE OF UTERUS N/A 6/17/2019    DILATATION AND CURETTAGE HYSTEROSCOPY performed by Farhat Case DO at [id number] 6/17/2019    DIAGNOSTIC LAPAROSCOPY, RIGHT OVARIAN CYSTECTOMY performed by Farhat Case DO at [address] [facility]  2009         CURRENT MEDICATIONS       Previous Medications    CIPROFLOXACIN (CIPRO) 500 MG TABLET    Take 1 tablet by mouth 2 times daily for 10 days    ETONOGESTREL (NEXPLANON) 68 MG IMPLANT    68 mg by Subdermal route See Admin Instructions Birth control -    KETOROLAC (TORADOL) 10 MG TABLET    Take 1 tablet by mouth every 6 hours as needed for Pain    NORGESTIMATE-ETHINYL ESTRADIOL (ORTHO-CYCLEN, 28,) 0.25-35 MG-MCG PER TABLET    Take 1 tablet by mouth daily    ONDANSETRON (ZOFRAN-ODT) 4 MG DISINTEGRATING TABLET    Place 1 tablet under the tongue every 8 hours as needed for Nausea or Vomiting May Sub regular tablet (non-ODT) if insurance does not cover ODT. TAMSULOSIN (FLOMAX) 0.4 MG CAPSULE    Take 1 capsule by mouth daily for 5 days       ALLERGIES     Patient has no known allergies.     FAMILY HISTORY       Family History   Problem Relation Age of Onset    Hypertension Maternal Grandmother     Breast Cancer Maternal Grandmother         27    Coronary Art Dis Maternal Grandmother     Emphysema Maternal Grandmother     Diabetes Father     Hypertension Father     Hypertension Mother     Other Mother         endometriosis, fibroids    Breast Cancer Paternal Grandmother           SOCIAL HISTORY       Social History     Socioeconomic History    Marital status:      Spouse name: None    Number of children: None    Years of education: None    Highest education level: None   Occupational History    Occupation: stay at home mom   Social Needs    Financial resource strain: None    Food insecurity     Worry: None     Inability: None    Transportation needs     Medical: None     Non-medical: None   Tobacco Use    Smoking status: Current Every Day Smoker     Packs/day: 0.25     Years: 3.00     Pack years: 0.75     Types: E-Cigarettes    Smokeless tobacco: Never Used   Substance and Sexual Activity    Alcohol use: Yes     Comment: rarely    Drug use: No    Sexual activity: Not Currently     Partners: Male     Comment: Nexplanon   Lifestyle    Physical activity     Days per week: None     Minutes per session: None    Stress: None   Relationships    Social connections     Talks on phone: None     Gets together: None     Attends Anabaptist service: None     Active member of club or organization: None     Attends meetings of clubs or organizations: None     Relationship status: None    Intimate partner violence     Fear of current or ex partner: None     Emotionally abused: None     Physically abused: None     Forced sexual activity: None   Other Topics Concern    None   Social History Narrative    None       SCREENINGS      @FLOW([id number])@      PHYSICAL EXAM    (up to 7 for level 4, 8 or more for level 5)     ED Triage Vitals [07/08/20 0039]   BP Temp Temp Source Pulse Resp SpO2 Height Weight   (!) 141/97 98 °F (36.7 °C) Oral 89 18 100 % 5' 6\" (1.676 m) 250 lb (113.4 kg)       Physical Exam      General Appearance:  Alert, cooperative, no distress, appears stated age. Head:  Normocephalic, without obviousabnormality, atraumatic. Eyes:  conjunctiva/corneas clear, EOM's intact. Sclera anicteric. ENT: Mucous membranes moist.   Neck: Supple, symmetrical, trachea midline, no adenopathy. No jugular venous distention. Lungs:   Clear to auscultation bilaterally, respirationsunlabored. No rales, rhonchi or wheezes. Chest Wall:  No tenderness. Heart:  Regular rate and rhythm, S1 and S2 normal, no murmur, rub or gallop. Abdomen:   Soft, non-tender, bowel sounds active,   no masses, no organomegaly. Extremities: No edema, cords or calf tenderness. Full range of motion. Pulses: 2+ and symmetric   Skin: Turgor is normal, no rashes or lesions. Neurologic: Alert and oriented X 3. No focal findings.   Motor grossly normal.  Speech clear, no drift, CN III-XII grossly intact,        DIAGNOSTIC RESULTS   LABS:    Labs Reviewed   URINE RT REFLEX TO CULTURE - Abnormal; Notable for the following components:       Result Value    Clarity, UA SL CLOUDY (*)     Bilirubin Urine SMALL (*)     Blood, Urine LARGE (*)     Protein, UA TRACE (*)     All other components within normal limits    Narrative:     Performed at:  [facility],  [place], [place]   Phone [phone]   MICROSCOPIC URINALYSIS - Abnormal; Notable for the following components:    Mucus, UA 1+ (*)     WBC, UA 6-9 (*)     RBC, UA 21-50 (*)     Epithelial Cells, UA 6-10 (*)     Bacteria, UA Rare (*)     All other components within normal limits    Narrative:     Performed at:  [facility],  [place] [employer] [employer]   Phone [phone]   CBC WITH AUTO DIFFERENTIAL    Narrative:     Performed at:  [address] [place] [address],  [place], [facility]   Phone [phone]   COMPREHENSIVE METABOLIC PANEL    Narrative:     Performed at:  [place] [address],  [place], [facility]   Phone [phone]   TROPONIN    Narrative:     Performed at:  [place] [address],  [place], [facility]   Phone [phone]   LIPASE    Narrative:     Performed at:  [facility],  [place] [employer]   Phone [phone]   HCG, SERUM, QUALITATIVE    Narrative:     Performed at:  [facility],  [place], [place]   Phone [phone]       All other labs were within normal range or not returned as of this dictation. EKG: All EKG's are interpreted by the Emergency Department Physician who eithersigns or Co-signs this chart in the absence of a cardiologist.        RADIOLOGY:   Non-plain film images such as CT, Ultrasound and MRI are read by the radiologist. Plain radiographic images are visualized by myself.       *    Interpretation per the Radiologist below, if available at the time of this note:    XR CHEST STANDARD (2 VW)   Final Result   No acute airspace disease identified. PROCEDURES   Unless otherwise noted below, none     Procedures    *    CRITICAL CARE TIME   N/A      EMERGENCY DEPARTMENT COURSE and DIFFERENTIALDIAGNOSIS/MDM:   Vitals:    Vitals:    07/08/20 0039   BP: (!) 141/97   Pulse: 89   Resp: 18   Temp: 98 °F (36.7 °C)   TempSrc: Oral   SpO2: 100%   Weight: 250 lb (113.4 kg)   Height: 5' 6\" (1.676 m)       Patient was given thefollowing medications:  Medications   HYDROmorphone (DILAUDID) injection 1 mg (1 mg Intravenous Given 7/8/20 0050)   ondansetron (ZOFRAN) injection 4 mg (4 mg Intravenous Given 7/8/20 0050)   famotidine (PEPCID) injection 20 mg (20 mg Intravenous Given 7/8/20 0050)           The patient tolerated their visit well. The patient and / or the familywere informed of the results of any tests, a time was given to answer questions. FINAL IMPRESSION      1. Acute superficial gastritis without hemorrhage          DISPOSITION/PLAN   DISPOSITION Decision To Discharge 07/08/2020 01:38:38 AM  Abdomen is benign did not feel the need to repeat a CT scan since she had 1 done 4 days ago plan is for discharge with Pepcid and follow-up with family doctor    PATIENT REFERRED TO:  No follow-up provider specified.     DISCHARGE MEDICATIONS:  New Prescriptions    FAMOTIDINE (PEPCID) 20 MG TABLET    Take 1 tablet by mouth 2 times daily       DISCONTINUED MEDICATIONS:  Discontinued Medications    No medications on file              (Please note that portions of this note were completed with a voice recognition program.  Efforts were made to edit the dictations but occasionally words are mis-transcribed.)    Marisela Borden MD (electronically signed)      Marisela Borden MD  07/08/20 [id number]

## 2020-07-11 NOTE — ED PROVIDER NOTES
EMERGENCY DEPARTMENT ENCOUNTER      This patient was not seen and evaluated by the attending physician. Pt Name: Chris Reddy  MRN: [id number]  [place] 1990  Date of evaluation: 7/8/2020  Provider: DEMETRIS Ambrocio  PCP: DEMETRIS Baum CNP  ED Attending: Dr. Marivel Rose    History provided by the patient. CHIEF COMPLAINT:     Chief Complaint   Patient presents with    Abdominal Pain     abdominal pain started yesterday morning. HISTORY OF PRESENT ILLNESS:      Chris Reddy is a 30 y.o. female who presents [facility]  ED with complaints of kori pain. Patient states that she had abdominal pain that started yesterday morning. Patient was recently evaluated at [facility], and she states that \"I do not think they did a very good exam\". Patient states that pain is the same as it was yesterday. Patient has a known kidney stone but she states that she does not think this was causing her pain. She denies any diarrhea, fever, vaginal discharge or dysuria. Patient currently on antibiotics for urinary tract infection as well. Patient denies any chest pain or difficulty breathing. She is here for further evaluation. Denies fever. Huma Estrin  Severity:6  Duration:today  Modifying factors:none noted. Nursing Notes were reviewed     REVIEW OF SYSTEMS:     Review of Systems  All systems, atotal of 10, are reviewed and negative except for those that were just noted in history present illness. PAST MEDICAL HISTORY:     Past Medical History:   Diagnosis Date    Breast disorder     left breast tenderness and pain.     Diabetes mellitus ([address]     Heart disease     Herpes simplex virus (HSV) infection     History of blood transfusion     Hypertension     occas    Ovarian cyst          SURGICAL HISTORY:      Past Surgical History:   Procedure Laterality Date    DILATION AND CURETTAGE      DILATION AND CURETTAGE SpO2:  100% 100% 100%   Weight:       Height:         HENT: Normocephalic. Atraumatic. External ears normal, without discharge. TMs clear bilaterally. No nasal discharge. Oropharynx clear, no erythema. Mucous membranes moist.  EYES: Conjunctiva non-injected, no lid abnormalities noted. No scleral icterus. PERRL. EOM's grossly intact. Anterior chambers clear. NECK: Supple. Normal ROM. No meningismus. No thyroid tenderness or swelling noted. CARDIOVASCULAR: RRR. No Murmer. PULMONARY/CHEST WALL: Effort normal. No tachypnea. Lungs clear to ausculation. ABDOMEN: Normal BS. Soft. Nondistended. Mild diffuse tenderness noted to the abdomen and lower abdomen. No guarding. No hernias noted. No splenomegaly. Back: Spine is midline. No ecchymosis. No crepitus on palpation. No obvious subluxation of vertebral column. No saddle anesthesia or evidence of cauda equina. /ANORECTAL: Not assessed  MUSKULOSKELETAL: Normal ROM. No acute deformities. No edema. No tenderness to palpate. SKIN: Warm and dry. NEUROLOGICAL:  GCS 15. CN II-XII grossly intact. Strength is 5/5 in allextremities and sensation is intact. PSYCHIATRIC: Normal affect, normal insight and judgement. Alert andoriented x 3.         DIAGNOSTIC RESULTS:     LABS:    Results for orders placed or performed during the hospital encounter of 07/08/20   CBC Auto Differential   Result Value Ref Range    WBC 6.3 4.0 - 11.0 K/uL    RBC 4.18 4.00 - 5.20 M/uL    Hemoglobin 11.6 (L) 12.0 - 16.0 g/dL    Hematocrit 34.9 (L) 36.0 - 48.0 %    MCV 83.5 80.0 - 100.0 fL    MCH 27.8 26.0 - 34.0 pg    MCHC 33.3 31.0 - 36.0 g/dL    RDW 14.3 12.4 - 15.4 %    Platelets 837 596 - 401 K/uL    MPV 7.4 5.0 - 10.5 fL    Neutrophils % 68.7 %    Lymphocytes % 22.2 %    Monocytes % 6.2 %    Eosinophils % 2.5 %    Basophils % 0.4 %    Neutrophils Absolute 4.3 1.7 - 7.7 K/uL    Lymphocytes Absolute 1.4 1.0 - 5.1 K/uL    Monocytes Absolute 0.4 0.0 - 1.3 K/uL    Eosinophils Absolute 0.2 0.0 - 0.6 K/uL    Basophils Absolute 0.0 0.0 - 0.2 K/uL   Comprehensive Metabolic Panel   Result Value Ref Range    Sodium 140 136 - 145 mmol/L    Potassium 3.9 3.5 - 5.1 mmol/L    Chloride 107 99 - 110 mmol/L    CO2 22 21 - 32 mmol/L    Anion Gap 11 3 - 16    Glucose 121 (H) 70 - 99 mg/dL    BUN 10 7 - 20 mg/dL    CREATININE 0.8 0.6 - 1.1 mg/dL    GFR Non-African American >60 >60    GFR African American >60 >60    Calcium 8.9 8.3 - 10.6 mg/dL    Total Protein 6.6 6.4 - 8.2 g/dL    Alb 3.7 3.4 - 5.0 g/dL    Albumin/Globulin Ratio 1.3 1.1 - 2.2    Total Bilirubin 0.3 0.0 - 1.0 mg/dL    Alkaline Phosphatase 67 40 - 129 U/L    ALT 19 10 - 40 U/L    AST 23 15 - 37 U/L    Globulin 2.9 g/dL   Lipase   Result Value Ref Range    Lipase 34.0 13.0 - 60.0 U/L   Urinalysis, reflex to microscopic   Result Value Ref Range    Color, UA Yellow Straw/Yellow    Clarity, UA CLOUDY (A) Clear    Glucose, Ur Negative Negative mg/dL    Bilirubin Urine Negative Negative    Ketones, Urine Negative Negative mg/dL    Specific Gravity, UA >=1.030 1.005 - 1.030    Blood, Urine MODERATE (A) Negative    pH, UA 6.0 5.0 - 8.0    Protein, UA Negative Negative mg/dL    Urobilinogen, Urine 0.2 <2.0 E.U./dL    Nitrite, Urine Negative Negative    Leukocyte Esterase, Urine Negative Negative    Microscopic Examination YES     Urine Type NotGiven    HCG Qualitative, Serum   Result Value Ref Range    hCG Qual Negative Detects HCG level >10 MIU/mL   Microscopic Urinalysis   Result Value Ref Range    Mucus, UA 1+ (A) None Seen /LPF    WBC, UA 6-9 (A) 0 - 5 /HPF    RBC, UA 21-50 (A) 0 - 4 /HPF    Epithelial Cells, UA 6-10 (A) 0 - 5 /HPF    Bacteria, UA 1+ (A) None Seen /HPF         RADIOLOGY:  All x-ray studies are viewed/reviewedby me. Formal interpretations per the radiologist are as follows:      [address]   Final Result   Flow was seen within each ovary, arguing against acute torsion.       Left ovarian cyst, with complexity better demonstrated on the prior study. Small amount of free pelvic fluid. US PELVIS COMPLETE   Final Result   Addendum 1 of 1   ADDENDUM:   The left ovary shows normal Doppler blood flow. No sonographic evidence    of   left ovarian torsion. Final      CT ABDOMEN PELVIS W IV CONTRAST Additional Contrast? None   Final Result   Moderate complex free pelvic fluid is seen, likely hemorrhage. 4.1 cm   complex cystic lesion is also seen at the left adnexa. Pelvic ultrasound is   recommended for further assessment. The previously demonstrated left ureteral calculus is no longer visualized   and left hydronephrosis has resolved. Splenomegaly, similar to prior. EKG:  See EKG interpretation by an attending phsyician      PROCEDURES:   N/A    CRITICAL CARE TIME:   N/A    CONSULTS:  None      EMERGENCYDEPARTMENT COURSE and DIFFERENTIAL DIAGNOSIS/MDM:   Vitals:    Vitals:    07/08/20 1541 07/08/20 1559 07/08/20 1704 07/08/20 1912   BP:  (!) 141/76 115/66 (!) 142/90   Pulse: 79 75 62 67   Resp:  14 14 18   Temp:       TempSrc:       SpO2:  100% 100% 100%   Weight:       Height:           Patient was given the following medications:  Medications   iopamidol (ISOVUE-370) 76 % injection 75 mL (75 mLs Intravenous Given 7/8/20 1733)         Patient was evaluated by both myself and Dr. Tor Diaz. She presented to the emergency room today with complaints of abdominal pain. Patient had a CT of the abdomen and pelvis which showed some complex free pelvic fluid which they said likely is hemorrhage related. Patient also had a 4.1 complex stick lesion likely a hemorrhagic cyst.  I did do a pelvic ultrasound which did show no evidence of ovarian torsion. Patient was discharged home with instructions to follow-up with primary care physician or her OB/GYN. Patient work-up otherwise was unremarkable. She was instructed return to the ED for worsening symptoms. Vital signs stable upon discharge.   Patient was evaluated by the attending physician who also agrees with plan of care. Patient laboratory studies, radiographic imaging, andassessment were all discussed with the patient and/or patient family. There was shared decision-making between myself, the attending physician, as well as the patient and/or their surrogate and we are all in agreement with discharge home. There was an opportunity for questions and all questions were answered to the best of my ability and to the satisfaction of the patient and/or patient family. MDM  Considered AAA, appendicitis, diverticulitis, pancreatitis, perforated peptic ulcer, perforated viscus, early appendicitis, bowel obstruction, cholecystitis, constipation, gastroenteritis, hepatitis, inflammatory bowel disease, intussesception, ischemic bowel, neoplasm, PUD, renal/ureteral calculi, gonadal torsion, UTI, volvulus. Currently no surgical or severe medical etiology found. Pt. afebrile, toleration PO without difficulty, good pain control, patient able to follow detailed discharge instructions provided. If worsening pain or concerns return to ED. Otherwise follow up with PCP in 12-24 hours for recheck. FINAL IMPRESSION:      1.  Generalized abdominal pain          DISPOSITION/PLAN:   DISPOSITIONDecision To Discharge      PATIENT REFERRED TO:  DEMETRIS Lee CNP  [address] [place] [phone]    Call   For follow up    Ike Spurling, MD  [address] [place] [address] [phone]    Call   For follow up      DISCHARGE MEDICATIONS:  Discharge Medication List as of 7/8/2020  9:10 PM                     (Please note that portions of this note were completed with a voice recognition program.  Efforts were made to edit the dictations, but occasionally words are mis-transcribed.)    DEMETRIS Garcia CNP-C (electronically signed)        DEMETRIS Garcia CNP  07/10/20 [id number]

## 2020-12-08 ENCOUNTER — TELEPHONE (OUTPATIENT)
Dept: OBGYN CLINIC | Age: 30
End: 2020-12-08

## 2020-12-09 ENCOUNTER — OFFICE VISIT (OUTPATIENT)
Dept: OBGYN CLINIC | Age: 30
End: 2020-12-09
Payer: COMMERCIAL

## 2020-12-09 VITALS
SYSTOLIC BLOOD PRESSURE: 128 MMHG | HEART RATE: 84 BPM | TEMPERATURE: 97.8 F | WEIGHT: 254.6 LBS | BODY MASS INDEX: 41.09 KG/M2 | DIASTOLIC BLOOD PRESSURE: 86 MMHG

## 2020-12-09 LAB
BACTERIA: ABNORMAL /HPF
BILIRUBIN URINE: NEGATIVE
BLOOD, URINE: NEGATIVE
CLARITY: ABNORMAL
COLOR: YELLOW
CONTROL: ABNORMAL
CRL: NORMAL
EPITHELIAL CELLS, UA: 14 /HPF (ref 0–5)
GLUCOSE URINE: NEGATIVE MG/DL
HYALINE CASTS: 3 /LPF (ref 0–8)
KETONES, URINE: NEGATIVE MG/DL
LEUKOCYTE ESTERASE, URINE: ABNORMAL
MICROSCOPIC EXAMINATION: YES
NITRITE, URINE: NEGATIVE
PH UA: 6 (ref 5–8)
PREGNANCY TEST URINE, POC: POSITIVE
PROTEIN UA: NEGATIVE MG/DL
RBC UA: 1 /HPF (ref 0–4)
SAC DIAMETER: NORMAL
SPECIFIC GRAVITY UA: 1.02 (ref 1–1.03)
URINE TYPE: ABNORMAL
UROBILINOGEN, URINE: 0.2 E.U./DL
WBC UA: 7 /HPF (ref 0–5)

## 2020-12-09 PROCEDURE — 76801 OB US < 14 WKS SINGLE FETUS: CPT | Performed by: OBSTETRICS & GYNECOLOGY

## 2020-12-09 PROCEDURE — 99395 PREV VISIT EST AGE 18-39: CPT | Performed by: OBSTETRICS & GYNECOLOGY

## 2020-12-09 PROCEDURE — G8484 FLU IMMUNIZE NO ADMIN: HCPCS | Performed by: OBSTETRICS & GYNECOLOGY

## 2020-12-09 PROCEDURE — 36415 COLL VENOUS BLD VENIPUNCTURE: CPT | Performed by: OBSTETRICS & GYNECOLOGY

## 2020-12-09 PROCEDURE — 81025 URINE PREGNANCY TEST: CPT | Performed by: OBSTETRICS & GYNECOLOGY

## 2020-12-09 NOTE — PROGRESS NOTES
[facility]   Annual Exam     CC:   Chief Complaint   Patient presents with    Amenorrhea        HPI:  35 y.o. [id number] presents for her gynecologic annual exam. She complains of Amenorrhea. States her LMP was Patient's last menstrual period was 10/15/2020 (approximate). Denies any VB / admits to some cramping and fatigue since that time. Denies nausea/vomiting. Taking prenatal vitamin. Health Maintenance:  Safe Enviroment: Y  Healthy diet: N  Exercise: N  Contraception: None    - On OCPs, stopped taking due to cramping     Screening:  Last pap smear: Unk  History of abnormal pap smears: Denies  STI History: Denies      Vaccines:  Gardasil vaccine: unk  Flu vaccine: unk      Review of Systems:   Review of Systems   Constitutional: Negative for activity change, appetite change and fatigue. Eyes: Negative for photophobia and visual disturbance. Respiratory: Negative for shortness of breath. Cardiovascular: Negative for chest pain, palpitations and leg swelling. Gastrointestinal: Negative for abdominal pain, nausea and vomiting. Genitourinary: Negative for difficulty urinating.        (+) Menstrual Problem; absence of Menses   (+) Pelvic Cramping; Mild    Skin: Negative for color change. Neurological: Negative for dizziness and numbness. Hematological: Negative for adenopathy. Does not bruise/bleed easily. Psychiatric/Behavioral: Negative for behavioral problems. The patient is not nervous/anxious.         Primary Care Physician: DEMETRIS Gallardo - CNP    Obstetric History  OB History    Para Term  AB Living   3 2 2   1 2   SAB TAB Ectopic Molar Multiple Live Births   1         2      # Outcome Date GA Lbr Ole/2nd Weight Sex Delivery Anes PTL Lv   3 Term 17 40w0d  7 lb 13 oz (3.544 kg) F Vag-Spont EPI  JAMES   2 Term 10/10/12 37w0d  6 lb 7 oz (2.92 kg) F Vag-Spont EPI  JAMES   1 SAB              Complications with previous pregnancy:    - GHTN with both pregnancies Problem Relation Age of Onset    Hypertension Maternal Grandmother     Breast Cancer Maternal Grandmother         27    Coronary Art Dis Maternal Grandmother     Emphysema Maternal Grandmother     Diabetes Father     Hypertension Father     Hypertension Mother     Other Mother         endometriosis, fibroids    Breast Cancer Paternal Grandmother        (+) personal/family history of cervical, uterine, ovarian, vulvar, breast, or colon cancers.   Denies personal/family history of bleeding or clotting disorders  Denies personal/family history of genetic disorders    Social History:  Social History     Socioeconomic History    Marital status:      Spouse name: None    Number of children: None    Years of education: None    Highest education level: None   Occupational History    Occupation: stay at home mom   Social Needs    Financial resource strain: None    Food insecurity     Worry: None     Inability: None    Transportation needs     Medical: None     Non-medical: None   Tobacco Use    Smoking status: Current Every Day Smoker     Packs/day: 0.25     Years: 3.00     Pack years: 0.75     Types: E-Cigarettes    Smokeless tobacco: Never Used   Substance and Sexual Activity    Alcohol use: Not Currently     Comment: rarely    Drug use: No    Sexual activity: Yes     Partners: Male   Lifestyle    Physical activity     Days per week: None     Minutes per session: None    Stress: None   Relationships    Social connections     Talks on phone: None     Gets together: None     Attends Gnosticist service: None     Active member of club or organization: None     Attends meetings of clubs or organizations: None     Relationship status: None    Intimate partner violence     Fear of current or ex partner: None     Emotionally abused: None     Physically abused: None     Forced sexual activity: None   Other Topics Concern    None   Social History Narrative    None       Objective: Body mass index is 41.09 kg/m². /86 (Site: Right Upper Arm, Position: Sitting, Cuff Size: Large Adult)   Pulse 84   Temp 97.8 °F (36.6 °C) (Infrared)   Wt 254 lb 9.6 oz (115.5 kg)   LMP 10/15/2020 (Approximate)   Breastfeeding No   BMI 41.09 kg/m²   General: Alert, well appearing, no acute distress  Head: Normocephalic, atraumatic  Mouth: Mucous membranes moist, pharynx normal without lesions  Thyroid: No thyromegaly or masses present   Breasts: Symmetric, non-tender to palpation, no skin changes, palpable axillary lymph nodes or masses noted  Lungs: Clear to auscultation bilaterally without rales, rhonchi, wheezing  CV: Regular rate and regular rhythm, normal S1, S2 without murmurs, rubs, clicks or gallops. Abdomen: Soft, nontender, nondistended   Pelvic:   External: normal appearing genitalia without masses, tenderness or lesions  Vagina: moist, pink mucosa, without abnormal discharge or lesions  Cervix: no masses or lesions visualized, no cervical motion tenderness  Uterus: mobile, midline, no masses palpated  Adnexa: mobile, non-tender to palpation, without masses  Rectovaginal: deferred  Extremities: No redness or tenderness, neg Roosevelt's sign  Skin: Well perfused, normal coloration and turgor, no lesions or rashes visualized  Neuro: Alert, oriented, normal speech, no focal deficits, moves extremities appropriately  Osteopathic: No TART changes    Imaging:  OBSTETRIC ULTRASOUND--1ST TRIMESTER    DATE: 12/09/2020    PHYSICIAN: ULISES Payton.     SONOGRAPHER: ESTEVAN Jade [facility]    INDICATION: Amenorrhea    TYPE OF SCAN:  vaginal    FINDINGS:      The cul de sac is normal.  The cervix is normal.  The uterus is gravid. The endometrium is heterogenous with multiple cystic areas. The uterus measures 11.62 cm x 8.34 cm x 6.83 cm. No uterine anomalies are evident. The right ovary is not visualized. The left ovary is present. The left ovary measures 3.74 cm x 2.21 cm x 2.91 cm. There is a single intrauterine pregnancy identified. A fetal pole is noted with a CRL measuring 0.44 cm, consistent with gestational age of 6weeks and 1days and EDC of 08/03/2021. There is a 12 day discrepancy when compared with the gestational age of 7weeks and 6days and EDC of 07/22/2021 set by [facility] (10/15/2020). Yolk sac is present and measures 0.36 cm. Fetal cardiac activity is present at 122 bpm.     IMPRESSION:    Single IUP with cardiac activity. Cystic endometrium. Assessment/Plan:  35 y.o. [id number] presenting for her annual exam with (+) preg test:     Diagnosis Orders   1. Women's annual routine gynecological examination     2. Positive pregnancy test  URINALYSIS WITH MICROSCOPIC    Culture, Urine    POCT urine pregnancy   3. Pap smear for cervical cancer screening  PAP SMEAR   4. Possible exposure to STD  VAGINAL PATHOGENS PROBE *A    C.trachomatis N.gonorrhoeae DNA   5. Amenorrhea     6. Thickened endometrium  HCG, QUANTITATIVE, PREGNANCY   7. BMI 40.0-44.9, adult ([address]     8. Hx of pre-eclampsia in prior pregnancy, currently pregnant     9. History of gestational hypertension        - reviewed US results -- possible etiologies include 1) intrauterine scar tissue from previous D and C, 2) Failed twin pregnancy, 3) Molar pregnancy, 4) IUP with benign fluid, etc...   - will do Quant HCG and Type/Screen today   - plan repeat TVUS and Quant HCG early next week     Follow Up  - Will call patient with results   -Return in about 1 week (around 12/16/2020) for Recheck, Ultrasound.     Jill Smith DO

## 2020-12-10 LAB
C TRACH DNA GENITAL QL NAA+PROBE: NEGATIVE
CANDIDA SPECIES, DNA PROBE: ABNORMAL
GARDNERELLA VAGINALIS, DNA PROBE: ABNORMAL
GONADOTROPIN, CHORIONIC (HCG) QUANT: NORMAL MIU/ML
N. GONORRHOEAE DNA: NEGATIVE
TRICHOMONAS VAGINALIS DNA: ABNORMAL
URINE CULTURE, ROUTINE: NORMAL

## 2020-12-12 ASSESSMENT — ENCOUNTER SYMPTOMS
NAUSEA: 0
PHOTOPHOBIA: 0
ABDOMINAL PAIN: 0
SHORTNESS OF BREATH: 0
VOMITING: 0
COLOR CHANGE: 0

## 2020-12-15 ENCOUNTER — TELEPHONE (OUTPATIENT)
Dept: OBGYN CLINIC | Age: 30
End: 2020-12-15

## 2020-12-16 ENCOUNTER — INITIAL PRENATAL (OUTPATIENT)
Dept: OBGYN CLINIC | Age: 30
End: 2020-12-16
Payer: COMMERCIAL

## 2020-12-16 ENCOUNTER — OFFICE VISIT (OUTPATIENT)
Dept: OBGYN CLINIC | Age: 30
End: 2020-12-16
Payer: COMMERCIAL

## 2020-12-16 LAB
ABDOMINAL CIRCUMFERENCE: NORMAL
BIPARIETAL DIAMETER: NORMAL
ESTIMATED FETAL WEIGHT: NORMAL
FEMORAL DIAMETER: NORMAL
HC/AC: NORMAL
HEAD CIRCUMFERENCE: NORMAL

## 2020-12-16 PROCEDURE — 36415 COLL VENOUS BLD VENIPUNCTURE: CPT | Performed by: OBSTETRICS & GYNECOLOGY

## 2020-12-16 PROCEDURE — 99213 OFFICE O/P EST LOW 20 MIN: CPT | Performed by: OBSTETRICS & GYNECOLOGY

## 2020-12-16 PROCEDURE — 4004F PT TOBACCO SCREEN RCVD TLK: CPT | Performed by: OBSTETRICS & GYNECOLOGY

## 2020-12-16 PROCEDURE — G8417 CALC BMI ABV UP PARAM F/U: HCPCS | Performed by: OBSTETRICS & GYNECOLOGY

## 2020-12-16 PROCEDURE — G8484 FLU IMMUNIZE NO ADMIN: HCPCS | Performed by: OBSTETRICS & GYNECOLOGY

## 2020-12-16 PROCEDURE — 76817 TRANSVAGINAL US OBSTETRIC: CPT | Performed by: OBSTETRICS & GYNECOLOGY

## 2020-12-16 PROCEDURE — G8428 CUR MEDS NOT DOCUMENT: HCPCS | Performed by: OBSTETRICS & GYNECOLOGY

## 2020-12-16 RX ORDER — ONDANSETRON 4 MG/1
4 TABLET, FILM COATED ORAL EVERY 8 HOURS PRN
Qty: 20 TABLET | Refills: 0 | Status: SHIPPED | OUTPATIENT
Start: 2020-12-16

## 2020-12-16 NOTE — PROGRESS NOTES
35 y.o. [id number] at [facility] 2117 estimated gestational age, Estimated Date of Delivery: 8/3/21 here for IPV and follow up US:     Pt seen and examined. Here today for repeat ultrasound following findings of abnormal cystic endometrium. PT denies any symptoms, including vaginal bleeding, pelvic pain, fever, chills, nausea, emesis. Objective:  /78 (Position: Sitting)   Pulse 81   Temp 98.2 °F (36.8 °C)   Wt 259 lb 6.4 oz (117.7 kg)   LMP 10/15/2020 (Approximate)   BMI 41.87 kg/m²   Gen: AO, NAD  Abd: Soft, NT, gravid   Ext: Mild LE edema  OMM: Increased lumbar lordosis    Image:  OBSTETRIC ULTRASOUND--1ST TRIMESTER    DATE: 12/16/20    PHYSICIAN: ULISES Estes     SONOGRAPHER: ESTEVAN Jade RDMS     INDICATION: follow up endometrium    TYPE OF SCAN:  vaginal    FINDINGS:      The cul de sac is normal.  The cervix is normal.  The uterus is gravid. Cystic endometrium, thinner and less impressive compared to prior imaging. There is a single intrauterine pregnancy identified. A fetal pole is noted with a CRL measuring 1.10 cm, consistent with gestational age of 9weeks and 4days and EDC of 8/3/21. Fetal cardiac activity is present at 157 bpm.     IMPRESSION:    Single IUP with cardiac activity. Cystic endometrium present but improved compared to prior imaging. Assessment/Plan:   Diagnosis Orders   1. Prenatal care in first trimester  HCG, Quantitative, Pregnancy    Type and Screen    CBC Auto Differential    TSH with Reflex    HIV Screen    Hep C AB RLFX HCV PCR-A    Syphilis Antibody Cascading Reflex    Drug Screen Multi Urine With Bup   2. Nausea/vomiting in pregnancy  ondansetron (ZOFRAN) 4 MG tablet   3. Thickened endometrium     4. BMI 40.0-44.9, adult ([address]     5. Hx of pre-eclampsia in prior pregnancy, currently pregnant     6. History of gestational hypertension        - reviewed ultrasound results with PT, although cystic region within endometrium has improved, it has not resolved.  Maintain that possible etiologies include (1) intrauterine scar tissue form previous D and C, (2) Failed twin pregnancy, (3) Molar pregnancy, (4) IUP with benign fluid, etc.   - reviewed Quant HCG which is within normal range for healthy pregnancy. - will draw IVP labs today  - will repeat ultrasound in 4-7 days  - will consider consult with MFM pending results at next visit  - due to history of GHTN and pre-eclampsia, will plan for ASA at 12 weeks, baseline labs, growth ultrasounds, and  testing at 32-34 weeks, if pregnancy remains viable. - due to obesity, plan for early GCT at next visit. - return OB precautions reviewed     Follow Up  Return in about 1 week (around 2020) for Recheck, Ultrasound, Return OB visit.     Jose Angel Steven DO

## 2020-12-17 ENCOUNTER — TELEPHONE (OUTPATIENT)
Dept: OBGYN CLINIC | Age: 30
End: 2020-12-17

## 2020-12-17 LAB
ABO/RH: NORMAL
AMPHETAMINE SCREEN, URINE: NORMAL
ANTIBODY SCREEN: NORMAL
BARBITURATE SCREEN URINE: NORMAL
BASOPHILS ABSOLUTE: 0 K/UL (ref 0–0.2)
BASOPHILS RELATIVE PERCENT: 0.6 %
BENZODIAZEPINE SCREEN, URINE: NORMAL
BUPRENORPHINE URINE: NORMAL
CANNABINOID SCREEN URINE: NORMAL
COCAINE METABOLITE SCREEN URINE: NORMAL
EOSINOPHILS ABSOLUTE: 0.1 K/UL (ref 0–0.6)
EOSINOPHILS RELATIVE PERCENT: 1.1 %
GONADOTROPIN, CHORIONIC (HCG) QUANT: NORMAL MIU/ML
HCT VFR BLD CALC: 35.9 % (ref 36–48)
HEMOGLOBIN: 11.9 G/DL (ref 12–16)
HIV AG/AB: NORMAL
HIV ANTIGEN: NORMAL
HIV-1 ANTIBODY: NORMAL
HIV-2 AB: NORMAL
LYMPHOCYTES ABSOLUTE: 1.5 K/UL (ref 1–5.1)
LYMPHOCYTES RELATIVE PERCENT: 21.6 %
Lab: NORMAL
MCH RBC QN AUTO: 26.3 PG (ref 26–34)
MCHC RBC AUTO-ENTMCNC: 33.1 G/DL (ref 31–36)
MCV RBC AUTO: 79.6 FL (ref 80–100)
METHADONE SCREEN, URINE: NORMAL
MONOCYTES ABSOLUTE: 0.6 K/UL (ref 0–1.3)
MONOCYTES RELATIVE PERCENT: 9.5 %
NEUTROPHILS ABSOLUTE: 4.6 K/UL (ref 1.7–7.7)
NEUTROPHILS RELATIVE PERCENT: 67.2 %
OPIATE SCREEN URINE: NORMAL
OXYCODONE URINE: NORMAL
PDW BLD-RTO: 15.3 % (ref 12.4–15.4)
PH UA: 8
PHENCYCLIDINE SCREEN URINE: NORMAL
PLATELET # BLD: 232 K/UL (ref 135–450)
PMV BLD AUTO: 7.5 FL (ref 5–10.5)
PROPOXYPHENE SCREEN: NORMAL
RBC # BLD: 4.51 M/UL (ref 4–5.2)
TOTAL SYPHILLIS IGG/IGM: NORMAL
TSH REFLEX: 1.07 UIU/ML (ref 0.27–4.2)
WBC # BLD: 6.9 K/UL (ref 4–11)

## 2020-12-18 LAB
HEPATITIS C VIRUS AB BY CIA INDEX: 0.02 IV
HEPATITIS C VIRUS AB BY CIA INTERPRETATION: NEGATIVE

## 2020-12-21 VITALS
HEART RATE: 81 BPM | SYSTOLIC BLOOD PRESSURE: 110 MMHG | BODY MASS INDEX: 41.87 KG/M2 | WEIGHT: 259.4 LBS | TEMPERATURE: 98.2 F | DIASTOLIC BLOOD PRESSURE: 78 MMHG

## 2020-12-23 ENCOUNTER — ROUTINE PRENATAL (OUTPATIENT)
Dept: OBGYN CLINIC | Age: 30
End: 2020-12-23
Payer: COMMERCIAL

## 2020-12-23 ENCOUNTER — OFFICE VISIT (OUTPATIENT)
Dept: OBGYN CLINIC | Age: 30
End: 2020-12-23
Payer: COMMERCIAL

## 2020-12-23 VITALS
SYSTOLIC BLOOD PRESSURE: 116 MMHG | HEART RATE: 93 BPM | WEIGHT: 254.4 LBS | DIASTOLIC BLOOD PRESSURE: 76 MMHG | BODY MASS INDEX: 41.06 KG/M2

## 2020-12-23 DIAGNOSIS — R93.89 ABNORMAL PELVIC ULTRASOUND: ICD-10-CM

## 2020-12-23 DIAGNOSIS — O09.299 HX OF PRE-ECLAMPSIA IN PRIOR PREGNANCY, CURRENTLY PREGNANT: ICD-10-CM

## 2020-12-23 DIAGNOSIS — O99.211 OBESITY AFFECTING PREGNANCY IN FIRST TRIMESTER: ICD-10-CM

## 2020-12-23 DIAGNOSIS — Z87.59 HISTORY OF GESTATIONAL HYPERTENSION: ICD-10-CM

## 2020-12-23 DIAGNOSIS — O21.9 NAUSEA/VOMITING IN PREGNANCY: ICD-10-CM

## 2020-12-23 DIAGNOSIS — Z87.42 HX OF OVARIAN CYST: ICD-10-CM

## 2020-12-23 DIAGNOSIS — O41.8X10 SUBCHORIONIC HEMATOMA IN FIRST TRIMESTER, SINGLE OR UNSPECIFIED FETUS: ICD-10-CM

## 2020-12-23 DIAGNOSIS — R93.5 ABNORMAL ULTRASOUND OF UTERUS: Primary | ICD-10-CM

## 2020-12-23 DIAGNOSIS — Z34.81 PRENATAL CARE, SUBSEQUENT PREGNANCY IN FIRST TRIMESTER: Primary | ICD-10-CM

## 2020-12-23 DIAGNOSIS — O46.8X1 SUBCHORIONIC HEMATOMA IN FIRST TRIMESTER, SINGLE OR UNSPECIFIED FETUS: ICD-10-CM

## 2020-12-23 PROCEDURE — 99213 OFFICE O/P EST LOW 20 MIN: CPT | Performed by: OBSTETRICS & GYNECOLOGY

## 2020-12-23 PROCEDURE — G8484 FLU IMMUNIZE NO ADMIN: HCPCS | Performed by: OBSTETRICS & GYNECOLOGY

## 2020-12-23 PROCEDURE — G8427 DOCREV CUR MEDS BY ELIG CLIN: HCPCS | Performed by: OBSTETRICS & GYNECOLOGY

## 2020-12-23 PROCEDURE — 76817 TRANSVAGINAL US OBSTETRIC: CPT | Performed by: OBSTETRICS & GYNECOLOGY

## 2020-12-23 PROCEDURE — 99999 PR OFFICE/OUTPT VISIT,PROCEDURE ONLY: CPT | Performed by: OBSTETRICS & GYNECOLOGY

## 2020-12-23 PROCEDURE — G8417 CALC BMI ABV UP PARAM F/U: HCPCS | Performed by: OBSTETRICS & GYNECOLOGY

## 2020-12-23 PROCEDURE — 4004F PT TOBACCO SCREEN RCVD TLK: CPT | Performed by: OBSTETRICS & GYNECOLOGY

## 2020-12-23 NOTE — PROGRESS NOTES
Temp- 97.4f Infrared  Maternal emotional well being screening form completed and reviewed with patient. Current score is 8. Patient given referral to [address] [place] [address] ([phone]):  No

## 2020-12-23 NOTE — PROGRESS NOTES
35 y.o. [id number] at [address] estimated gestational age, Estimated Date of Delivery: 8/3/21 here for IPV and follow up US:     Pt seen and examined. Here today for repeat ultrasound following findings of abnormal cystic endometrium. PT denies any symptoms, including vaginal bleeding, pelvic pain, fever, chills, nausea, emesis. Does admit to some cramping -- believes it to be associated with constipation -- encouraged PO hydration, colace and fiber. Nausea has improved since last visit. Denies FM. MWQ reviewed (8). Objective:  /76   Pulse 93   Wt 254 lb 6.4 oz (115.4 kg)   LMP 10/15/2020 (Approximate)   BMI 41.06 kg/m²   Gen: AO, NAD  Abd: Soft, NT, gravid   Ext: Mild LE edema  OMM: Increased lumbar lordosis    Image:  Impression   OBSTETRIC ULTRASOUND--1ST TRIMESTER       DATE: 12/23/20       PHYSICIAN: UILSES HE        SONOGRAPHER: ESTEVAN Jade RDMS       INDICATION: follow up       COMPARISION: 12/16/20       TYPE OF SCAN:  vaginal       FINDINGS:         The cul de sac is normal.  The cervix is normal.  The uterus is gravid.       The uterus measures 12.50 cm x 7.54 cm.        The right ovary is not visualized.        The left ovary is not visualized.        There is a single intrauterine pregnancy identified.  A fetal pole is noted with a CRL measuring 1.90 cm, consistent with gestational age of 8weeks and 3days and EDC of 8/1/21. Balinda Calico is a 10 day discrepancy when compared with the gestational age of 9weeks and 6days and EDC of 7/22/21 set by [facility] (10/15/20). Yolk sac is present and measures 0.34 cm.        Fetal cardiac activity is present at 179 bpm. Moderately sized subchorionic hematoma.       IMPRESSION:     Single IUP with cardiac activity. Moderately sized subchorionic hematoma. Assessment/Plan:   Diagnosis Orders   1. Prenatal care, subsequent pregnancy in first trimester     2. Abnormal pelvic ultrasound     3.  Subchorionic hematoma in first trimester, single or unspecified fetus 4. Obesity affecting pregnancy in first trimester     5. Hx of pre-eclampsia in prior pregnancy, currently pregnant  aspirin EC 81 MG EC tablet   6. History of gestational hypertension     7. Hx of ovarian cysts     8. Nausea/vomiting in pregnancy (improving)        1. Prenatal Care, subsequent pregnancy   - Reassuring fetal / maternal status  - Aneuploidy Screen: FTS (pending)    - AFP: 15-20 wks   - Anatomy:   - Flu : Offer at next visit   - PNL: A+/ab(-), RNeeds, HepBNeeds, HIVnR, RPRnR, Varicella Needs, TSH 1.07, Hgb 11.9, Plt 232, UDS neg, UCx neg, GCCT neg, Pap NILM 12/19/20  - early GCT: at next visit  - early PIH:  at next visit   - 28 wk:    - Tdap:    - GBS:    - COVID:      2. Abnormal Pelvic US   - Amenorrhea US on 12/9/20 showed a viable IUP,  bpm surrounded by a heterogenous endometrium with multiple cystic areas. Possible etiologies were reviewed, including 1) intrauterine scar tissue from previous D and C, 2) Failed twin pregnancy, 3) Molar pregnancy, 4) IUP with benign fluid, 5) NOS. - Quant HCG done and were in range for normal pregnancy 20,877 (12/9) > 45,760 (12/16). - Rpt US on 12/16/20 showed improvement to the cystic region, thinner and less pronounced w/ adequate growth of fetus and gestational sac  bpm. Pt remains asymptomatic (no VB, pelvic pain, abnormal discharge, fevers/chills, SOB) except for mild nausea. - US from today reviewed -- (+) FHT with appropriate growth, cystic regions less pronounced, moderately sized [facility] is noted today however. - will repeat US in 4 weeks   - refer pt to [facility] for consult and FTS  - return precautions reviewed     3. Subchorionic Hemorrhage  [facility] noted today, moderate size  - (+)    - asymptomatic  - return / bleeding precautions reviewed   - recheck in 4 weeks     4.  Obesity   - Pre Gravid BMI 41.2  - TWG: -9.6 oz (-0.272 kg)   - Recommend 11-20 lbs weight gain / healthy habits reviewed  - needs early GCT next visit  - anatomy US at [facility] 120     5. History of Pre Eclampsia and GHTN in previous pregnancies   - BP normal today 116/76   - records reveal possible CHTN -- /82 at visit on 20   - BP precautions reviewed  - plan for MFM consult   -  testingat 32-34 wks   - growth scan q 4 weeks in third trimester   - to start daily ASA at 12 wks   [facility]   - baseline 24hr urine at next visit     6. Hx of ovarian cysts   - Hx of Laparoscopic ovarian cystectomy (19) with recurrence of simple, enlarged ovarian cysts   - ovaries not visualized today on US   - asymptomatic   - precautions reviewed     Follow Up  · Return OB precautions reviewed   · MFM for consult and FTS btwn 11-13 wks   · Needs to Complete IPV panel, early GCT and [facility]   · Offer Flu shot at next visit   · Return in about 4 weeks (around 2021) for Return OB visit, Ultrasound, Labs.     [facility], DO

## 2021-01-02 PROBLEM — Z87.42 HX OF OVARIAN CYST: Status: ACTIVE | Noted: 2021-01-02

## 2021-01-02 PROBLEM — R93.89 ABNORMAL PELVIC ULTRASOUND: Status: ACTIVE | Noted: 2021-01-02

## 2021-01-02 PROBLEM — O21.9 NAUSEA/VOMITING IN PREGNANCY: Status: ACTIVE | Noted: 2021-01-02

## 2021-01-02 PROBLEM — O99.211 OBESITY AFFECTING PREGNANCY IN FIRST TRIMESTER: Status: ACTIVE | Noted: 2021-01-02

## 2021-01-02 PROBLEM — Z87.59 HISTORY OF GESTATIONAL HYPERTENSION: Status: ACTIVE | Noted: 2021-01-02

## 2021-01-02 PROBLEM — O09.299 HX OF PRE-ECLAMPSIA IN PRIOR PREGNANCY, CURRENTLY PREGNANT: Status: ACTIVE | Noted: 2021-01-02

## 2021-01-02 PROBLEM — Z34.81 PRENATAL CARE, SUBSEQUENT PREGNANCY IN FIRST TRIMESTER: Status: ACTIVE | Noted: 2021-01-02

## 2021-01-02 RX ORDER — ASPIRIN 81 MG/1
81 TABLET ORAL DAILY
Qty: 90 TABLET | Refills: 1 | Status: SHIPPED | OUTPATIENT
Start: 2021-01-18

## 2021-01-07 ENCOUNTER — TELEPHONE (OUTPATIENT)
Dept: OBGYN CLINIC | Age: 31
End: 2021-01-07

## 2021-01-07 DIAGNOSIS — O23.591: Primary | ICD-10-CM

## 2021-01-07 DIAGNOSIS — N76.0: Primary | ICD-10-CM

## 2021-01-07 RX ORDER — METRONIDAZOLE 7.5 MG/G
GEL TOPICAL
Qty: 1 TUBE | Refills: 0 | Status: SHIPPED | OUTPATIENT
Start: 2021-01-07

## 2021-01-07 NOTE — TELEPHONE ENCOUNTER
Pt 10 w 2d. Last pap 12/9/20. Calling for concern of vaginal itching, odor and irritation. She denies abdominal pain, fever or dysuria. She is requesting medication be sent for tx. Pended metrogel. Please advise. Pt states she had been treated a couple of weeks ago for same issue and had went away but is now returned.

## 2021-01-20 ENCOUNTER — OFFICE VISIT (OUTPATIENT)
Dept: OBGYN CLINIC | Age: 31
End: 2021-01-20
Payer: COMMERCIAL

## 2021-01-20 ENCOUNTER — ROUTINE PRENATAL (OUTPATIENT)
Dept: OBGYN CLINIC | Age: 31
End: 2021-01-20
Payer: COMMERCIAL

## 2021-01-20 VITALS
WEIGHT: 253.25 LBS | HEART RATE: 82 BPM | SYSTOLIC BLOOD PRESSURE: 126 MMHG | BODY MASS INDEX: 40.88 KG/M2 | DIASTOLIC BLOOD PRESSURE: 84 MMHG

## 2021-01-20 DIAGNOSIS — Z34.81 PRENATAL CARE, SUBSEQUENT PREGNANCY IN FIRST TRIMESTER: Primary | ICD-10-CM

## 2021-01-20 DIAGNOSIS — O21.9 NAUSEA/VOMITING IN PREGNANCY: ICD-10-CM

## 2021-01-20 DIAGNOSIS — O99.211 OBESITY AFFECTING PREGNANCY IN FIRST TRIMESTER: ICD-10-CM

## 2021-01-20 DIAGNOSIS — O28.3 ABNORMAL FETAL ULTRASOUND: Primary | ICD-10-CM

## 2021-01-20 DIAGNOSIS — O09.299 HX OF PRE-ECLAMPSIA IN PRIOR PREGNANCY, CURRENTLY PREGNANT: ICD-10-CM

## 2021-01-20 DIAGNOSIS — O28.3 INCREASED NUCHAL TRANSLUCENCY SPACE ON FETAL ULTRASOUND: ICD-10-CM

## 2021-01-20 DIAGNOSIS — O46.8X1 SUBCHORIONIC HEMATOMA IN FIRST TRIMESTER, SINGLE OR UNSPECIFIED FETUS: ICD-10-CM

## 2021-01-20 DIAGNOSIS — O41.8X10 SUBCHORIONIC HEMATOMA IN FIRST TRIMESTER, SINGLE OR UNSPECIFIED FETUS: ICD-10-CM

## 2021-01-20 DIAGNOSIS — Z87.59 HISTORY OF GESTATIONAL HYPERTENSION: ICD-10-CM

## 2021-01-20 DIAGNOSIS — O26.91 ABNORMAL PREGNANCY IN FIRST TRIMESTER: ICD-10-CM

## 2021-01-20 PROCEDURE — G8484 FLU IMMUNIZE NO ADMIN: HCPCS | Performed by: OBSTETRICS & GYNECOLOGY

## 2021-01-20 PROCEDURE — G8417 CALC BMI ABV UP PARAM F/U: HCPCS | Performed by: OBSTETRICS & GYNECOLOGY

## 2021-01-20 PROCEDURE — 99214 OFFICE O/P EST MOD 30 MIN: CPT | Performed by: OBSTETRICS & GYNECOLOGY

## 2021-01-20 PROCEDURE — 36415 COLL VENOUS BLD VENIPUNCTURE: CPT | Performed by: OBSTETRICS & GYNECOLOGY

## 2021-01-20 PROCEDURE — 4004F PT TOBACCO SCREEN RCVD TLK: CPT | Performed by: OBSTETRICS & GYNECOLOGY

## 2021-01-20 PROCEDURE — 76816 OB US FOLLOW-UP PER FETUS: CPT | Performed by: OBSTETRICS & GYNECOLOGY

## 2021-01-20 PROCEDURE — G8428 CUR MEDS NOT DOCUMENT: HCPCS | Performed by: OBSTETRICS & GYNECOLOGY

## 2021-01-20 RX ORDER — PNV NO.95/FERROUS FUM/FOLIC AC 28MG-0.8MG
1 TABLET ORAL DAILY
Qty: 30 TABLET | Refills: 5 | Status: SHIPPED | OUTPATIENT
Start: 2021-01-20

## 2021-01-20 NOTE — PROGRESS NOTES
4. Hx of pre-eclampsia in prior pregnancy, currently pregnant  Comprehensive Metabolic Panel    Lactate Dehydrogenase    Uric Acid    Protein / creatinine ratio, urine   5. History of gestational hypertension     6. Nausea/vomiting in pregnancy (improving)     7. Abnormal pregnancy in first trimester     8. Increased nuchal translucency space on fetal ultrasound          1. Prenatal Care, subsequent pregnancy   - Reassuring fetal / maternal status    - Aneuploidy Screen: FTS (scheduled with MFM 1/25/21)    - AFP: 15-20 wks   - Anatomy:   - Flu : Offer at next visit   - PNL: A+/ab(-), RI, HepBnR, HIVnR, RPRnR, Varicella Immune, TSH 1.07, Hgb 11.9, Plt 232, UDS neg, UCx neg, GCCT neg, Pap NILM 12/19/20  - early GCT: 135 > needs 3hr   - early PIH: Cr 0.6, ALT 6, AST 12, , UA 3.5, , PCR 0.2   - 28 wk:    - Tdap:    - GBS:    - COVID:      2. Abnormal pregnancy in first trimester   - Amenorrhea US on 12/9/20 showed a viable IUP,  bpm surrounded by a heterogenous endometrium with multiple cystic areas. Possible etiologies were reviewed, including 1) intrauterine scar tissue from previous D and C, 2) Failed twin pregnancy, 3) Molar pregnancy, 4) IUP with benign fluid, 5) NOS. - Quant HCG done and were in range for normal pregnancy 20,877 (12/9) > 45,760 (12/16). - Rpt US on 12/16/20 showed improvement to the cystic region, thinner and less pronounced w/ adequate growth of fetus and gestational sac  bpm. Pt remains asymptomatic (no VB, pelvic pain, abnormal discharge, fevers/chills, SOB) except for mild nausea. - US from today reviewed -- (+) FHT with appropriate growth, cystic regions less pronounced, moderately sized [facility] is noted but improving. Incidental NT thickness noted on scan -- pt has apt w/ [facility] on 1/25/21 for official First Trimester Screen. - plan for AFP between 15-20 wks   - return precautions reviewed     3.  Subchorionic Hemorrhage  [facility] noted today, decreasing in size - (+) FHT   - asymptomatic  - return / bleeding precautions reviewed     4. Obesity   - Pre Gravid BMI 41.2  - TWG: -1 lb 12 oz (-0.794 kg)   - Recommend 11-20 lbs weight gain / healthy habits reviewed  - early GCT pending   - Recommend Anatomy US at [address]     5. History of Pre Eclampsia and GHTN in previous pregnancies   - BP normal today 126/84  - records reveal possible CHTN -- /82 at visit 20   - BP precautions reviewed  - plan for MFM consult   -  testingat 32-34 wks   - growth scan q 4 weeks in third trimester   - daily ASA at 12 wks -- pt started today   - early [facility] labs pending   - baseline 24hr urine pending     6. Nausea in Pregnancy   - Improving  - Zofran as needed     Approximately 30 minutes spent in room counseling patient on condition and coordination of care with over 50% in direct face to face counseling. Follow Up  · Return OB precautions reviewed   · MFM for consult and FTS on 21   · Offer Flu shot at next visit   Return in about 4 weeks (around 2021) for Return OB visit.      Alfredo Espinosa, DO

## 2021-01-21 ENCOUNTER — TELEPHONE (OUTPATIENT)
Dept: OBGYN CLINIC | Age: 31
End: 2021-01-21

## 2021-01-21 LAB
A/G RATIO: 1.1 (ref 1.1–2.2)
ALBUMIN SERPL-MCNC: 3.5 G/DL (ref 3.4–5)
ALP BLD-CCNC: 60 U/L (ref 40–129)
ALT SERPL-CCNC: 6 U/L (ref 10–40)
ANION GAP SERPL CALCULATED.3IONS-SCNC: 10 MMOL/L (ref 3–16)
AST SERPL-CCNC: 12 U/L (ref 15–37)
BILIRUB SERPL-MCNC: <0.2 MG/DL (ref 0–1)
BUN BLDV-MCNC: 9 MG/DL (ref 7–20)
CALCIUM SERPL-MCNC: 9 MG/DL (ref 8.3–10.6)
CHLORIDE BLD-SCNC: 102 MMOL/L (ref 99–110)
CO2: 22 MMOL/L (ref 21–32)
CREAT SERPL-MCNC: 0.6 MG/DL (ref 0.6–1.1)
CREATININE URINE: 148.8 MG/DL (ref 28–259)
GFR AFRICAN AMERICAN: >60
GFR NON-AFRICAN AMERICAN: >60
GLOBULIN: 3.1 G/DL
GLUCOSE BLD-MCNC: 135 MG/DL (ref 70–99)
HEPATITIS B SURFACE ANTIGEN INTERPRETATION: NORMAL
LACTATE DEHYDROGENASE: 192 U/L (ref 100–190)
POTASSIUM SERPL-SCNC: 3.9 MMOL/L (ref 3.5–5.1)
PROTEIN PROTEIN: 35 MG/DL
PROTEIN/CREAT RATIO: 0.2 MG/DL
RUBELLA ANTIBODY IGG: 28 IU/ML
SODIUM BLD-SCNC: 134 MMOL/L (ref 136–145)
TOTAL PROTEIN: 6.6 G/DL (ref 6.4–8.2)
URIC ACID, SERUM: 3.5 MG/DL (ref 2.6–6)
VARICELLA-ZOSTER VIRUS AB, IGG: NORMAL

## 2021-01-21 NOTE — TELEPHONE ENCOUNTER
Patient is asking about urine protein, and the rest of her labs in case she needs to change anything or do anything different.      Routing to Dr. Espinoza Cr

## 2021-01-22 NOTE — TELEPHONE ENCOUNTER
The rest of her labs look good. Urine protein was 0.2 -- which is not elevated in pregnancy, it is borderline. But it gives us a baseline going forward. Sorry for the delay!      Angélica

## 2021-02-23 ENCOUNTER — TELEPHONE (OUTPATIENT)
Dept: OBGYN CLINIC | Age: 31
End: 2021-02-23

## 2021-02-24 ENCOUNTER — ROUTINE PRENATAL (OUTPATIENT)
Dept: OBGYN CLINIC | Age: 31
End: 2021-02-24
Payer: COMMERCIAL

## 2021-02-24 ENCOUNTER — OFFICE VISIT (OUTPATIENT)
Dept: OBGYN CLINIC | Age: 31
End: 2021-02-24
Payer: COMMERCIAL

## 2021-02-24 ENCOUNTER — TELEPHONE (OUTPATIENT)
Dept: OBGYN CLINIC | Age: 31
End: 2021-02-24

## 2021-02-24 VITALS
BODY MASS INDEX: 40.13 KG/M2 | WEIGHT: 248.6 LBS | SYSTOLIC BLOOD PRESSURE: 130 MMHG | DIASTOLIC BLOOD PRESSURE: 72 MMHG | HEART RATE: 80 BPM

## 2021-02-24 DIAGNOSIS — K21.9 GASTROESOPHAGEAL REFLUX DISEASE, UNSPECIFIED WHETHER ESOPHAGITIS PRESENT: ICD-10-CM

## 2021-02-24 DIAGNOSIS — Z87.59 HISTORY OF GESTATIONAL HYPERTENSION: ICD-10-CM

## 2021-02-24 DIAGNOSIS — Z34.82 PRENATAL CARE, SUBSEQUENT PREGNANCY IN SECOND TRIMESTER: Primary | ICD-10-CM

## 2021-02-24 DIAGNOSIS — O09.299 HX OF PRE-ECLAMPSIA IN PRIOR PREGNANCY, CURRENTLY PREGNANT: ICD-10-CM

## 2021-02-24 DIAGNOSIS — O26.91 ABNORMAL PREGNANCY IN FIRST TRIMESTER: ICD-10-CM

## 2021-02-24 DIAGNOSIS — O35.EXX0 KIDNEY ABNORMALITY OF FETUS ON PRENATAL ULTRASOUND: ICD-10-CM

## 2021-02-24 DIAGNOSIS — O21.9 NAUSEA/VOMITING IN PREGNANCY: ICD-10-CM

## 2021-02-24 DIAGNOSIS — G93.0 CHOROID PLEXUS CYST: ICD-10-CM

## 2021-02-24 DIAGNOSIS — R93.89 ABNORMAL ULTRASOUND: Primary | ICD-10-CM

## 2021-02-24 DIAGNOSIS — O41.8X10 SUBCHORIONIC HEMATOMA IN FIRST TRIMESTER, SINGLE OR UNSPECIFIED FETUS: ICD-10-CM

## 2021-02-24 DIAGNOSIS — O46.8X1 SUBCHORIONIC HEMATOMA IN FIRST TRIMESTER, SINGLE OR UNSPECIFIED FETUS: ICD-10-CM

## 2021-02-24 DIAGNOSIS — O99.212 OBESITY AFFECTING PREGNANCY IN SECOND TRIMESTER: ICD-10-CM

## 2021-02-24 DIAGNOSIS — Z36.9 ENCOUNTER FOR ANTENATAL SCREENING: ICD-10-CM

## 2021-02-24 DIAGNOSIS — N89.8 VAGINAL DISCHARGE: ICD-10-CM

## 2021-02-24 PROCEDURE — G8427 DOCREV CUR MEDS BY ELIG CLIN: HCPCS | Performed by: OBSTETRICS & GYNECOLOGY

## 2021-02-24 PROCEDURE — 99214 OFFICE O/P EST MOD 30 MIN: CPT | Performed by: OBSTETRICS & GYNECOLOGY

## 2021-02-24 PROCEDURE — G8484 FLU IMMUNIZE NO ADMIN: HCPCS | Performed by: OBSTETRICS & GYNECOLOGY

## 2021-02-24 PROCEDURE — 4004F PT TOBACCO SCREEN RCVD TLK: CPT | Performed by: OBSTETRICS & GYNECOLOGY

## 2021-02-24 PROCEDURE — 76805 OB US >/= 14 WKS SNGL FETUS: CPT | Performed by: OBSTETRICS & GYNECOLOGY

## 2021-02-24 PROCEDURE — G8417 CALC BMI ABV UP PARAM F/U: HCPCS | Performed by: OBSTETRICS & GYNECOLOGY

## 2021-02-24 PROCEDURE — 36415 COLL VENOUS BLD VENIPUNCTURE: CPT | Performed by: OBSTETRICS & GYNECOLOGY

## 2021-02-24 RX ORDER — FAMOTIDINE 20 MG/1
20 TABLET, FILM COATED ORAL 2 TIMES DAILY
Qty: 60 TABLET | Refills: 3 | Status: SHIPPED | OUTPATIENT
Start: 2021-02-24

## 2021-02-24 NOTE — TELEPHONE ENCOUNTER
Vilma Currie from [place] [id number] called and stated patient is scheduled for an US on the 10th of March with them.

## 2021-02-24 NOTE — PROGRESS NOTES
Temp 97.1      Maternal emotional well being screening form completed and reviewed with patient. Current score is 11.       .eastobgynglucose    Patient started drink at  10;36 and finished at  1038a  . Patient tolerated drink well.   1 green tube drawn at  . # 100 grams of Glucola. No complain of nausea and vomiting at this time, will continue to monitor.

## 2021-02-24 NOTE — PROGRESS NOTES
35 y.o. [id number] at 17w1d estimated gestational age Estimated Date of Delivery: 8/3/21 here for HR OB:     Pt seen and examined. She is feeling well today. Nausea has improved. Does admit to an increase in thick white vaginal discharge -- concern for yeast. Also suspects she may have a UTI, mild dysuria and frequency. Denies CNTX, LOF, (+) FM. MWQ reviewed (11). Early GTT today. She was last seen by [facility] on 1/25/21. A CVS was performed due to findings of thickened NT with Fetal Hydrops. Results of microarray were normal -- their note states this is reassuring, but does not rule out single gene disorder. Discussed recommendations from [facility] for patient to have early anatomy scan at 16 wks, a repeat scan at 20 wks and a fetal echo at 22 wks. Pt has not made these appointments --  I recommended we call while she is doing her 3hr. [facility] states earliest anatomy US apt is on 3/10/21. In light of this, we added on a early US in our office today with FU at [facility] on 3/10/21 for 20wk scan. Results noted below. Objective:  /72   Pulse 80   Wt 248 lb 9.6 oz (112.8 kg)   LMP 10/15/2020 (Approximate)   BMI 40.13 kg/m²   Gen: AO, NAD  Abd: Soft, NT, gravid   Ext: Mild LE edema  OMM: Increased lumbar lordosis    Image:  OBSTETRIC ULTRASOUND -- SECOND TRIMESTER    DATE:  2/24/21    PHYSICIAN: ULISES Cohn    SONOGRAPHER: Chinyere Rothman MS    INDICATION:  Second trimester, Anatomical screening    TYPE OF SCAN: vaginal, abdominal 3.5 MHz 5MHz    FINDINGS:      A single viable intrauterine pregnancy is noted in variable presentation. Cardiac and somatic activity are noted.      The following values were obtained:   Fetal heart rate    145bpm   BPD      4.06cm  91.9 %   Head Circumference    15.71cm 88.3 %    Abdominal Circumference   14.21cm 98.3 %   Femur Length     2.61cm  74.7 %   Humerus Length    2.46cm  76.9 %   Cerebellum     1.86cm  92.7 %   Amniotic fluid DVP    4.55cm   EFW      254g  >99 percentile    Subjective amniotic fluid volume is normal. Based on sonographic criteria, the estimated fetal age is 18weeks and 2days with EDC of 7/26/21. There is a 8 day discordance with the established EDC of 8/3/21. The patient has an anterior placenta that is at the internal cervical os. The evaluation of the lower uterine segment and cervix reveals normal appearing anatomy. Transvaginal cervical length is 6.94 cm with no funneling noted. The uterus is unremarkable/gravid. Maternal ovaries and adnexae are not well visualized due to the size of the uterus and patient's gravid state. Normal Anatomy Seen:  CSP    Lateral ventricles  Umbilical arteries  Cerebellum  Bladder   Cisterna magna  Face    Nuchal fold  Diaphragm   Profile   Upper extremities  Stomach   Nose/lips  Lower extremities  ACI    PCI       Suboptimal anatomy seen:  4 chamber heart, LVOT, RVOT, Ductal arch, Aortic arch, Spine    Abnormal anatomy seen:  1. The fetal kidneys appear echogenic bilaterally. 2. Bilateral choroid plexus cysts  3. There appears to be skin thickening present   4. Echogenic bowel   5. Lemon shaped head noted    The fetal genitalia is noted to be Male. IMPRESSION:  Single living IUP. No gross structural abnormalities are visualized. Amniotic fluid volume is subjectively normal. Placenta previa. Multiple anomalies noted as described above. Assessment/Plan:   Diagnosis Orders   1. Prenatal care, subsequent pregnancy in second trimester     2. Abnormal pregnancy in first trimester     3. Fetal hydrops  ALPHA FETOPROTEIN, MATERNAL   4. Obesity affecting pregnancy in second trimester  GLUCOSE TOLERANCE OBSTETRIC   5. Hx of pre-eclampsia in prior pregnancy, currently pregnant     6. History of gestational hypertension     7. Nausea/vomiting in pregnancy (improving)     8. Gastroesophageal reflux disease, unspecified whether esophagitis present  famotidine (PEPCID) 20 MG tablet   9.  Vaginal discharge  terconazole (TERAZOL 7) 0.4 % vaginal cream   10. Encounter for  screening     11. Subchorionic hematoma in first trimester, single (RESOLVED)     12. Echogenic bowel of fetus     13. Kidney abnormality of fetus on prenatal ultrasound     14. Choroid plexus cyst          1. Prenatal Care, subsequent pregnancy   - Reassuring fetal / maternal status    - Aneuploidy Screen: FTS (scheduled with [facility] 21)    - AFP: pending   - Anatomy:   - Flu : declines  - PNL: A+/ab(-), RI, HepBnR, HIVnR, RPRnR, Varicella Immune, TSH 1.07, Hgb 11.9, Plt 232, UDS neg, UCx neg, GCCT neg, Pap NILM 20  - early GCT: 135 > 3hr pending  - early PIH: Cr 0.6, ALT 6, AST 12, , UA 3.5, , PCR 0.2   - 28 wk:    - Tdap:    - GBS:    - COVID:      2. Fetal Hydrops / Abnormal fetal ultrasound   - Amenorrhea US on 20 showed a viable IUP,  bpm surrounded by a heterogenous endometrium with multiple cystic areas. - Quant HCG done and were in range for normal pregnancy 20,877 () > 45,760 (). - Rpt US on 20 showed improvement to the cystic region, thinner and less pronounced w/ adequate growth of fetus and gestational sac,  bpm.   - US from 21 (+) FHT with appropriate growth, cystic regions less pronounced, moderately sized [facility] is noted but improving. Incidental NT thickness noted on scan   - Seen by [facility] on 21 First Trimester Screen - CVS done due to findings of thickened NT with Fetal Hydrops. Results of microarray were normal -- their note states this is reassuring, but does not rule out single gene disorder.   - Discussed recommendations from [facility] for patient to have early anatomy scan at 16 wks, a repeat scan at 20 wks and a fetal echo at 22 wks. Pt has not made these appointments - [facility] states earliest anatomy US apt is on 3/10/21. In light of this, we added on a early US in our office today with FU at [facility] on 3/10/21 for 20 wk scan.    - Scan today shows 1) echogenic kidneys, 2) echogenic bowel, 3) bilateral choroid plexus cysts, 4) Skin tissue thickening, 5) Lemon sign, 6) Sub optimal spine and cardiac images. - AFP drawn today, Pending   - FU for repeat anatomy scan at 20wks with [facility]    3. GERD  - acid reflux, decreasing appetite  - uses TUMS prn  - Pepcid sent to pharmacy     4. Obesity   - Pre Gravid BMI 41.2  - TWG: -6 lb 6.4 oz (-2.903 kg)   - Recommend 11-20 lbs weight gain / healthy habits reviewed  - failed GCT, early GTT Pending   [facility]     5. History of Pre Eclampsia and GHTN in previous pregnancies   - BP normal today 130/72  - records reveal possible CHTN -- /82 at visit 20   - BP precautions reviewed  -  testingat 32-34 wks   - growth scan q 4 weeks in third trimester   - daily ASA at 12 wks   - [facility] labs noted above     6. Nausea in Pregnancy   - Improving  - Zofran as needed     7. Vaginal discharge  - concerned for yeast   - Terazole Rx sent to pharmacy    8. Dysuria  - UA / Urine culture pending     Approximately 30 minutes spent in room counseling patient on condition and coordination of care with over 50% in direct face to face counseling. Follow Up  · Return OB precautions reviewed   · [facility] on 3/10/21 for repeat anatomy US   Return in about 4 weeks (around 3/24/2021) for Return OB visit.      Danyell Sauceda DO

## 2021-02-25 LAB
GLUCOSE FASTING: 77 MG/DL
GLUCOSE TOLERANCE TEST 1 HOUR: 187 MG/DL
GLUCOSE TOLERANCE TEST 2 HOUR: 112 MG/DL
GLUCOSE TOLERANCE TEST 3 HOUR: 106 MG/DL

## 2021-03-02 LAB
AFP INTERPRETATION: NORMAL
AFP MOM: 1.64
AFP SPECIMEN: NORMAL
DATING: NORMAL
ESTIMATED DUE DATE: NORMAL
FETUS COUNT: NORMAL
GESTATIONAL AGE CALC AT COLLECT: NORMAL
HISTORY/NEURAL TUBE DEFECTS: NO
INSULIN DEP. DIABETIC: NO
MATERNAL AGE AT EDD: 29.7 YR
MATERNAL WEIGHT: NORMAL
PT AFP: 44 NG/ML
RACE: NORMAL
SMOKING: NORMAL

## 2021-04-22 ENCOUNTER — TELEPHONE (OUTPATIENT)
Dept: OBGYN CLINIC | Age: 31
End: 2021-04-22

## 2021-04-22 NOTE — TELEPHONE ENCOUNTER
Patient is scheduled for a prenatal appointment on 5/3/21. However, Dr. Diane Prado would like to see patient today or early next week. Patient has not been seen in office since 2/24/21.

## 2021-04-26 NOTE — TELEPHONE ENCOUNTER
Left message for patient again per Dr. Annette August , make sure patient has 28 wk appt with US set up at [facility].   \"If patient does not start following appropriately we may have to dismiss her\" Will attempt to reach out to patient again

## 2021-05-03 NOTE — TELEPHONE ENCOUNTER
Patient was scheduled for prenatal visit today 5/3/21. Patient no showed appointment. Called patient and was sent to , stressed the importance of regular prenatal care. Will attempt to contact patient again to schedule.

## 2021-05-03 NOTE — TELEPHONE ENCOUNTER
Thank you. We may be forced to dismiss patient if she continues to not be seen appropriately.      Angélica

## 2021-05-10 NOTE — TELEPHONE ENCOUNTER
Attempted to reach patient to schedule appointment. No answer left another voicemail asking patient to call office. Would you like a letter sent stating that patient will be dimissed following postpartum care or dismiss now?   Thank you

## 2021-05-11 NOTE — TELEPHONE ENCOUNTER
Please send letter stating that if patient cannot comply with scheduled apts (ie keeps her next apt and remaining visits) we will have to move towards dismissal during this pregnancy.      Angélica

## 2021-05-14 NOTE — TELEPHONE ENCOUNTER
Letter sent informed patient she will need an appointment by 5/28 or we will move towards dismissal.

## 2021-05-18 NOTE — TELEPHONE ENCOUNTER
Called patient to scheduled appointment. Patient did not answer left voicemail asking patient to call office to schedule appt asap.   Letter was sent out on 5/14/21 asking patient to schedule appointment before 5/28/21 otherwise we will move towards dismissal.

## 2021-05-28 NOTE — TELEPHONE ENCOUNTER
Patient sent a letter 2 weeks ago asking her to schedule by today's date otherwise she would be dismissed. Patient has not yet made an appointment. Several attempts have been made to contact patient including 2 letters and several phone calls. Ok to dismiss patient if appointment not made by the end of today?

## 2022-01-15 ENCOUNTER — HOSPITAL ENCOUNTER (EMERGENCY)
Age: 32
Discharge: HOME | End: 2022-01-15
Payer: COMMERCIAL

## 2022-01-15 VITALS
RESPIRATION RATE: 16 BRPM | OXYGEN SATURATION: 99 % | SYSTOLIC BLOOD PRESSURE: 150 MMHG | TEMPERATURE: 97.6 F | WEIGHT: 250 LBS | HEART RATE: 79 BPM | BODY MASS INDEX: 40.18 KG/M2 | HEIGHT: 66 IN | DIASTOLIC BLOOD PRESSURE: 108 MMHG

## 2022-01-15 DIAGNOSIS — N76.0 VAGINITIS AND VULVOVAGINITIS: Primary | ICD-10-CM

## 2022-01-15 LAB
BACTERIA WET PREP: NORMAL
BACTERIA: ABNORMAL /HPF
BILIRUBIN URINE: NEGATIVE
BLOOD, URINE: ABNORMAL
CLARITY: ABNORMAL
CLUE CELLS: NORMAL
COLOR: YELLOW
EPITHELIAL CELLS WET PREP: NORMAL
EPITHELIAL CELLS, UA: ABNORMAL /HPF (ref 0–5)
GLUCOSE URINE: NEGATIVE MG/DL
HCG(URINE) PREGNANCY TEST: NEGATIVE
KETONES, URINE: NEGATIVE MG/DL
LEUKOCYTE ESTERASE, URINE: ABNORMAL
MICROSCOPIC EXAMINATION: YES
NITRITE, URINE: NEGATIVE
PH UA: 7 (ref 5–8)
PROTEIN UA: ABNORMAL MG/DL
RBC UA: >100 /HPF (ref 0–4)
RBC WET PREP: NORMAL
SOURCE WET PREP: NORMAL
SPECIFIC GRAVITY UA: 1.01 (ref 1–1.03)
TRICHOMONAS PREP: NORMAL
URINE TYPE: ABNORMAL
UROBILINOGEN, URINE: 0.2 E.U./DL
WBC UA: ABNORMAL /HPF (ref 0–5)
WBC WET PREP: NORMAL
YEAST WET PREP: NORMAL

## 2022-01-15 PROCEDURE — 87252 VIRUS INOCULATION TISSUE: CPT

## 2022-01-15 PROCEDURE — 87591 N.GONORRHOEAE DNA AMP PROB: CPT

## 2022-01-15 PROCEDURE — 81001 URINALYSIS AUTO W/SCOPE: CPT

## 2022-01-15 PROCEDURE — 87210 SMEAR WET MOUNT SALINE/INK: CPT

## 2022-01-15 PROCEDURE — 6370000000 HC RX 637 (ALT 250 FOR IP): Performed by: PHYSICIAN ASSISTANT

## 2022-01-15 PROCEDURE — 87491 CHLMYD TRACH DNA AMP PROBE: CPT

## 2022-01-15 PROCEDURE — 99284 EMERGENCY DEPT VISIT MOD MDM: CPT

## 2022-01-15 PROCEDURE — 84703 CHORIONIC GONADOTROPIN ASSAY: CPT

## 2022-01-15 PROCEDURE — 87086 URINE CULTURE/COLONY COUNT: CPT

## 2022-01-15 PROCEDURE — 87253 VIRUS INOCULATE TISSUE ADDL: CPT

## 2022-01-15 RX ORDER — VALACYCLOVIR HYDROCHLORIDE 500 MG/1
1000 TABLET, FILM COATED ORAL 2 TIMES DAILY
Qty: 40 TABLET | Refills: 0 | Status: SHIPPED | OUTPATIENT
Start: 2022-01-15 | End: 2022-01-25

## 2022-01-15 RX ORDER — HYDROCODONE BITARTRATE AND ACETAMINOPHEN 5; 325 MG/1; MG/1
1 TABLET ORAL ONCE
Status: COMPLETED | OUTPATIENT
Start: 2022-01-15 | End: 2022-01-15

## 2022-01-15 RX ORDER — VALACYCLOVIR HYDROCHLORIDE 500 MG/1
1000 TABLET, FILM COATED ORAL ONCE
Status: COMPLETED | OUTPATIENT
Start: 2022-01-15 | End: 2022-01-15

## 2022-01-15 RX ADMIN — HYDROCODONE BITARTRATE AND ACETAMINOPHEN 1 TABLET: 5; 325 TABLET ORAL at 15:02

## 2022-01-15 RX ADMIN — VALACYCLOVIR HYDROCHLORIDE 1000 MG: 500 TABLET, FILM COATED ORAL at 15:02

## 2022-01-15 ASSESSMENT — PAIN DESCRIPTION - PAIN TYPE: TYPE: ACUTE PAIN

## 2022-01-15 ASSESSMENT — PAIN DESCRIPTION - LOCATION: LOCATION: VAGINA

## 2022-01-15 ASSESSMENT — PAIN SCALES - GENERAL
PAINLEVEL_OUTOF10: 4
PAINLEVEL_OUTOF10: 6

## 2022-01-15 NOTE — ED PROVIDER NOTES
[address] ED  EMERGENCY DEPARTMENT ENCOUNTER        Pt Name: Isabel Smith  MRN: [id number]  [place] 1990  Date of evaluation: 1/15/2022  Provider: ALEX Jackson  PCP: No primary care provider on file. This patient was not seen and evaluated by the attending physician No att. providers found. I have evaluated this patient. My supervising physician was available for consultation. CHIEF COMPLAINT       Chief Complaint   Patient presents with    Vaginitis     states started with a yeast infection on Monday has since possibly \"scratched it\" in her sleep and now states she has multiple spots on her labia that are crusty       HISTORY OF PRESENT ILLNESS   (Location/Symptom, Timing/Onset, Context/Setting, Quality, Duration, Modifying Factors, Severity)  Note limiting factors. Isabel Smith is a 28 y.o. female who presents via private vehicle from her home for evaluation of vaginitis. ED Course as of 01/15/22 1456   Sat Reinaldo 15, 2022   1257 She started with a yeast infection this past Monday evening. She notes she has scratched her labia in her sleep and over the last week she now has painful labia. She has painful crusting lesions. She has vaginal discharge but is on her menstrual cycle. She self diagnosed her yeast infection and has not taken any otc medications for it. She does not have a PCP currently, does not drive. She presents today as this was her first opportunity. She denies concern for STD. [CS]   1300 She denies hx of STI. She denies emesis abd pain aside from her typical cramping. She notes mild nausea, not atypical for her. She notes she had a lowgrade 100 point something fever yesterday any last night and she has been taking motrin for, no antipyretic use today. She denies urinary symptoms.   [CS]      ED Course User Index  [CS] Brodie Jackson     Nursing Notes were all reviewed and agreed with or any disagreements were addressed  in the HPI.    Pt was seen during the COVID 19 pandemic. Appropriate PPE worn by ME during patient encounters. Pt seen during a time with constrained hospital bed capacity and other potential inpatient and outpatient resources were constrained due to the viral pandemic. REVIEW OF SYSTEMS    (2-9 systems for level 4, 10 or more for level 5)     Review of Systems    Positives and Pertinent negatives as per HPI. Except as noted abovein the ROS, all other systems were reviewed and negative. PAST MEDICAL HISTORY     Past Medical History:   Diagnosis Date    Anemia     Breast disorder     left breast tenderness and pain.  Diabetes mellitus ([facility] [address]     Endometriosis     Heart disease     Herpes simplex virus (HSV) infection     History of blood transfusion     Hypertension     occas    Ovarian cyst          SURGICAL HISTORY     Past Surgical History:   Procedure Laterality Date    DILATION AND CURETTAGE      DILATION AND CURETTAGE OF UTERUS  2008    DILATION AND CURETTAGE OF UTERUS N/A 6/17/2019    DILATATION AND CURETTAGE HYSTEROSCOPY performed by Lachelle Valadez DO at [address] 6/17/2019    DIAGNOSTIC LAPAROSCOPY, RIGHT OVARIAN CYSTECTOMY [facility]         CURRENTMEDICATIONS       Previous Medications    ASPIRIN EC 81 MG EC TABLET    Take 1 tablet by mouth daily Starting at 12 weeks gestation    ETONOGESTREL (NEXPLANON) 68 MG IMPLANT    68 mg by Subdermal route See Admin Instructions Birth control -    FAMOTIDINE (PEPCID) 20 MG TABLET    Take 1 tablet by mouth 2 times daily    KETOROLAC (TORADOL) 10 MG TABLET    Take 1 tablet by mouth every 6 hours as needed for Pain    METRONIDAZOLE (METROGEL) 0.75 % GEL    Apply topically 2 times daily x 7 days -- once complete follow w/ yeast medication.     NORGESTIMATE-ETHINYL ESTRADIOL (ORTHO-CYCLEN, 28,) 0.25-35 MG-MCG PER TABLET    Take 1 tablet by mouth daily ONDANSETRON (ZOFRAN) 4 MG TABLET    Take 1 tablet by mouth every 8 hours as needed for Nausea    PRENATAL VIT-FE FUMARATE-FA (PRENATAL VITAMINS) 28-0.8 MG TABS    Take 1 tablet by mouth daily    TAMSULOSIN (FLOMAX) 0.4 MG CAPSULE    Take 1 capsule by mouth daily for 5 days         ALLERGIES     Patient has no known allergies. FAMILYHISTORY       Family History   Problem Relation Age of Onset    Hypertension Maternal Grandmother     Breast Cancer Maternal Grandmother         27    Coronary Art Dis Maternal Grandmother     Emphysema Maternal Grandmother    [place] Diabetes Father     Hypertension Father     Hypertension Mother     Other Mother         endometriosis, fibroids    Breast Cancer Paternal Grandmother           SOCIAL HISTORY       Social History     Socioeconomic History    Marital status:      Spouse name: None    Number of children: None    Years of education: None    Highest education level: None   Occupational History    Occupation: stay at home mom   Tobacco Use    Smoking status: Current Every Day Smoker     Packs/day: 0.25     Years: 3.00     Pack years: 0.75     Types: E-Cigarettes    Smokeless tobacco: Never Used   Vaping Use    Vaping Use: Every day    Substances: Nicotine   Substance and Sexual Activity    Alcohol use: Not Currently     Comment: rarely    Drug use: No    Sexual activity: Yes     Partners: Male   Other Topics Concern    None   Social History Narrative    None     Social Determinants of Health     Financial Resource Strain:     Difficulty of Paying Living Expenses: Not on file   Food Insecurity:     Worried About Running Out of Food in the Last Year: Not on file    Florencia of Food in the Last Year: Not on file   Transportation Needs:     Lack of Transportation (Medical): Not on file    Lack of Transportation (Non-Medical):  Not on file   Physical Activity:     Days of Exercise per Week: Not on file    Minutes of Exercise per Session: Not on file Stress:     Feeling of Stress : Not on file   Social Connections:     Frequency of Communication with Friends and Family: Not on file    Frequency of Social Gatherings with Friends and Family: Not on file    Attends Zoroastrianism Services: Not on file    Active Member of [address] [place] [address] [employer] or Organizations: Not on file    Attends Club or Organization Meetings: Not on file    Marital Status: Not on file   Intimate Partner Violence:     Fear of Current or Ex-Partner: Not on file    Emotionally Abused: Not on file    Physically Abused: Not on file    Sexually Abused: Not on file   Housing Stability:     Unable to Pay for Housing in the Last Year: Not on file    Number of Jicelsamomayi in the Last Year: Not on file    Unstable Housing in the Last Year: Not on file       SCREENINGS             PHYSICAL EXAM    (up to 7 for level 4, 8 or more for level 5)     ED Triage Vitals [01/15/22 1218]   BP Temp Temp Source Pulse Resp SpO2 Height Weight   (!) 174/119 97.6 °F (36.4 °C) Oral 84 16 99 % 5' 6\" (1.676 m) 250 lb (113.4 kg)       Physical Exam  Vitals and nursing note reviewed. Exam conducted with a chaperone present. Constitutional:       General: She is awake. She is not in acute distress. Appearance: Normal appearance. She is well-developed. She is not ill-appearing, toxic-appearing or diaphoretic. HENT:      Head: Normocephalic and atraumatic. Right Ear: External ear normal.      Left Ear: External ear normal.      Nose: Nose normal.      Mouth/Throat:      Mouth: Mucous membranes are moist.      Pharynx: Oropharynx is clear. Eyes:      General:         Right eye: No discharge. Left eye: No discharge. Extraocular Movements: Extraocular movements intact. Conjunctiva/sclera: Conjunctivae normal.      Pupils: Pupils are equal, round, and reactive to light. Cardiovascular:      Rate and Rhythm: Normal rate and regular rhythm.       Pulses:           Radial pulses are 2+ on the right side and 2+ on the left side. Heart sounds: Normal heart sounds. No murmur heard. No friction rub. No gallop. Pulmonary:      Effort: Pulmonary effort is normal. No respiratory distress. Breath sounds: Normal breath sounds. No wheezing or rales. Abdominal:      General: There is no distension. Palpations: Abdomen is soft. Tenderness: There is no abdominal tenderness. There is no right CVA tenderness, left CVA tenderness or guarding. Genitourinary:     Exam position: Supine. Labia:         Right: Tenderness and lesion present. No rash or injury. Left: Tenderness and lesion present. No rash or injury. Vagina: Normal.      Cervix: Cervical bleeding present. No cervical motion tenderness or friability. Comments: Ulcerations to bilateral labia minora and majora with thick homogenous appearing yellow discharge. No vesicles or pustules. Musculoskeletal:      Cervical back: Normal range of motion and neck supple. No rigidity. Lymphadenopathy:      Cervical: No cervical adenopathy. Skin:     General: Skin is warm and dry. Capillary Refill: Capillary refill takes less than 2 seconds. Findings: No rash. Neurological:      General: No focal deficit present. Mental Status: She is alert, oriented to person, place, and time and easily aroused. Sensory: No sensory deficit. Motor: No weakness. Gait: Gait normal.   Psychiatric:         Behavior: Behavior normal. Behavior is cooperative.            DIAGNOSTIC RESULTS   LABS:    Labs Reviewed   URINALYSIS - Abnormal; Notable for the following components:       Result Value    Clarity, UA SL CLOUDY (*)     Blood, Urine LARGE (*)     Protein, UA TRACE (*)     Leukocyte Esterase, Urine SMALL (*)     All other components within normal limits    Narrative:     Performed at:  [facility],  [place], [facility]   Phone [phone]   MICROSCOPIC URINALYSIS - Abnormal; Notable for the following components:    RBC, UA >100 (*)     Epithelial Cells, UA 11-20 (*)     Bacteria, UA 1+ (*)     All other components within normal limits    Narrative:     Performed at:  [facility]) - [facility],  [place], [facility]   Phone [phone]   WET PREP, GENITAL    Narrative:     Performed at:  [facility]) [facility],  [place], [facility]   Phone [phone]   CULTURE, URINE   C.TRACHOMATIS N.GONORRHOEAE DNA   CULTURE, HSV   PREGNANCY, URINE    Narrative:     Performed at:  [facility]) [facility],  [place], [facility]   Phone [phone]       All other labs were within normal range or not returned as of this dictation. EKG: All EKG's are interpreted by the Emergency Department Physician who either signs orCo-signs this chart in the absence of a cardiologist.  Please see their note for interpretation of EKG. RADIOLOGY:   Non-plain film images such as CT, Ultrasound and MRI are read by the radiologist. Plain radiographic images are visualized andpreliminarily interpreted by the  ED Provider with the below findings:        Interpretation perthe Radiologist below, if available at the time of this note:    No orders to display     No results found.        PROCEDURES   Unless otherwise noted below, none     Procedures    CRITICAL CARE TIME   N/A    CONSULTS:  None      EMERGENCY DEPARTMENT COURSE and DIFFERENTIALDIAGNOSIS/MDM:   Vitals:    Vitals:    01/15/22 1218   BP: (!) 174/119   Pulse: 84   Resp: 16   Temp: 97.6 °F (36.4 °C)   TempSrc: Oral   SpO2: 99%   Weight: 250 lb (113.4 kg)   Height: 5' 6\" (1.676 m)       Patient was given thefollowing medications:  Medications   valACYclovir (VALTREX) tablet 1,000 mg (has no administration in time range)   HYDROcodone-acetaminophen (NORCO) 5-325 MG per tablet 1 tablet (has no administration in time range) PDMP Monitoring:    Last PDMP Nicolette Christensen as Reviewed [facility]):  Review User Review Instant Review Result   Garth Barker 1/15/2022  2:52 PM Reviewed PDMP [1]       Urine Drug Screenings (1 yr)     Drug Screen Multi Urine With Bup  Collected: 12/16/2020  5:35 PM (Final result)   Narrative: Performed at:  [facility]  [address] [facility] [address]   Phone [phone]     Complete Results              Medication Contract and Consent for Opioid Use Documents Filed      No documents found                MDM:   Patient seen and evaluated. Old records reviewed. Diagnostic testing reviewed and results discussed. I have independently evaluated this patient based upon my scope of practice. Supervising physician was in the department for consultation as needed. Patient is a 20-year-old female who presents for evaluation of vaginitis. Clinical exam is highly concerning for HSV outbreak to the genitalia. Patient has documented history of HSV and her medical record however she notes that she has never to her knowledge been diagnosed with HSV and has never had anything like this happen to her before. She does note that her  has cold sores. She does not have a sign or symptom at this time concerning overtly for chlamydia or gonorrhea, she has no CMT I have no concern at this time for pelvic inflammatory infection. Wet prep is negative. Patient will be started on a summative antiviral therapy for high concern for HSV outbreak and she will be instructed to follow-up with her gynecologist.  Pt is in agreement with the current plan and all questions were addressed. Discharge Time out:  CC Reviewed Yes   Test Results Yes     Vitals:    01/15/22 1218   BP: (!) 174/119   Pulse: 84   Resp: 16   Temp: 97.6 °F (36.4 °C)   SpO2: 99%              FINAL IMPRESSION      1.  Vaginitis and vulvovaginitis          DISPOSITION/PLAN   DISPOSITION Decision To Discharge 01/15/2022 02:49:49 PM      PATIENT REFERREDTO:  Blue Doty MD  [place] [address]  [address] [facility] [place] [address]  [phone]    Schedule an appointment as soon as possible for a visit   For a recheck in 3-5   days, sooner if no improvement or worsening of symptoms    [place] ([place]) [facility]  [address] [facility]  Go to   If symptoms worsen      DISCHARGE MEDICATIONS:  New Prescriptions    MISC.  DEVICES (SITZ BATH) MISC    1 each by Does not apply route daily for 10 days    VALACYCLOVIR (VALTREX) 500 MG TABLET    Take 2 tablets by mouth 2 times daily for 10 days       DISCONTINUED MEDICATIONS:  Discontinued Medications    No medications on file              (Please note that portions ofthis note were completed with a voice recognition program.  Efforts were made to edit the dictations but occasionally words are mis-transcribed.)    Brodie Bunn (electronically signed)       Brodie Bunn  01/15/22 1509

## 2022-01-17 LAB
C TRACH DNA GENITAL QL NAA+PROBE: NEGATIVE
N. GONORRHOEAE DNA: NEGATIVE

## 2022-01-18 LAB — URINE CULTURE, ROUTINE: NORMAL

## 2022-01-19 LAB
FINAL REPORT: NORMAL
PRELIMINARY: NORMAL

## 2022-08-17 ENCOUNTER — TELEPHONE (OUTPATIENT)
Dept: OBGYN CLINIC | Age: 32
End: 2022-08-17

## 2022-10-29 ENCOUNTER — HOSPITAL ENCOUNTER (OUTPATIENT)
Dept: ULTRASOUND IMAGING | Age: 32
Discharge: HOME | End: 2022-10-29
Payer: COMMERCIAL

## 2022-10-29 DIAGNOSIS — R10.2 VAGINAL PAIN: ICD-10-CM

## 2022-10-29 PROCEDURE — 76856 US EXAM PELVIC COMPLETE: CPT

## 2023-05-09 NOTE — PROGRESS NOTES
Nexplanon Contraceptive Implant Removal Note    The pt is a 30 y.o. [id number] who presents today for removal of a subdermal contraceptive implant. She has been counseled regarding the risks, benefits and alternatives of the procedure. She has signed the consent form, and wishes to proceed with removal today. Current method of contraception: Nexplanon    Desired future contraception: OCPs    Procedure Details  The inner side of the left arm was cleaned with Betadine and infiltrated with 1% lidocaine. A scalpel was used to create a 1 mm incision along the distal aspect of the implant. A hemostat was used to remove the implant. Incision was then re-approximated with steri-strips and coban dressing. The patient tolerated the procedure without complications.     Mirza Sheridan, DO Was The Medication Purchased By The Clinic?: No Expiration Date (Optional): 2025-04-30 05325 Billing Preferences: 1 Administered By (Optional): GRECIA SEVERINO Ndc (300 Mg Prefilled Pen): [address]-54 Use Enhanced Ndc?: Yes Ndc (200 Mg Prefilled Syringe): [address]-95 Consent: The risks of pain and injection site reactions were reviewed with the patient prior to the injection. Ndc (300 Mg Prefilled Syringe): [address]-82 Syringe Size Used (Required For Enhanced Ndc): 300 mg/2ml prefilled pen Lot # (Optional): [id number] J-Code:  Detail Level: None Dupixent Amount: 600 mg

## 2024-07-02 NOTE — ED PROVIDER NOTES
-Olga tierney.   diarrhea, nausea and vomiting. Genitourinary: Positive for dyspareunia and pelvic pain. Negative for dysuria. All other systems reviewed and are negative. Positives and Pertinent negatives as per HPI. Except as noted abovein the ROS, all other systems were reviewed and negative. PAST MEDICAL HISTORY     Past Medical History:   Diagnosis Date    Hypertension          SURGICAL HISTORY   History reviewed. No pertinent surgical history. CURRENTMEDICATIONS       Previous Medications    No medications on file         ALLERGIES     Patient has no known allergies. FAMILYHISTORY     History reviewed. No pertinent family history.        SOCIAL HISTORY       Social History     Socioeconomic History    Marital status:      Spouse name: None    Number of children: None    Years of education: None    Highest education level: None   Occupational History    None   Social Needs    Financial resource strain: None    Food insecurity:     Worry: None     Inability: None    Transportation needs:     Medical: None     Non-medical: None   Tobacco Use    Smoking status: Current Every Day Smoker     Packs/day: 0.50     Types: E-Cigarettes    Smokeless tobacco: Never Used   Substance and Sexual Activity    Alcohol use: No    Drug use: No    Sexual activity: None   Lifestyle    Physical activity:     Days per week: None     Minutes per session: None    Stress: None   Relationships    Social connections:     Talks on phone: None     Gets together: None     Attends [employer] service: None     Active member of club or organization: None     Attends meetings of clubs or organizations: None     Relationship status: None    Intimate partner violence:     Fear of current or ex partner: None     Emotionally abused: None     Physically abused: None     Forced sexual activity: None   Other Topics Concern    None   Social History Narrative    None       SCREENINGS    [place] Coma Scale  Eye Opening: Spontaneous  Best Verbal Response: Oriented  Best Motor Response: Obeys commands  [place] Coma Scale Score: 15        PHYSICAL EXAM    (up to 7 for level 4, 8 or more for level 5)     ED Triage Vitals [04/25/19 1736]   BP Temp Temp Source Pulse Resp SpO2 Height Weight   (!) 150/100 98.5 °F (36.9 °C) Oral 84 16 98 % 5' 6\" (1.676 m) 250 lb (113.4 kg)       Physical Exam   Constitutional: She is oriented to person, place, and time. She appears well-developed and well-nourished. No distress. HENT:   Head: Normocephalic and atraumatic. Right Ear: External ear normal.   Left Ear: External ear normal.   Eyes: Right eye exhibits no discharge. Left eye exhibits no discharge. Neck: Normal range of motion. Neck supple. No JVD present. Cardiovascular: Normal rate and regular rhythm. Exam reveals no friction rub. No murmur heard. Pulmonary/Chest: Effort normal and breath sounds normal. No stridor. No respiratory distress. She has no wheezes. Abdominal: Soft. There is tenderness. Genitourinary: Cervix exhibits motion tenderness and discharge. Cervix exhibits no friability. Musculoskeletal: Normal range of motion. Neurological: She is alert and oriented to person, place, and time. Skin: Skin is warm and dry. She is not diaphoretic. No pallor. Psychiatric: She has a normal mood and affect. Her behavior is normal.   Nursing note and vitals reviewed.       DIAGNOSTIC RESULTS   LABS:    Labs Reviewed   URINE RT REFLEX TO CULTURE - Abnormal; Notable for the following components:       Result Value    Protein, UA 30 (*)     All other components within normal limits    Narrative:     Performed at:  [facility] [address],  [place], [facility]   Phone [phone]   MICROSCOPIC URINALYSIS - Abnormal; Notable for the following components:    Mucus, UA 2+ (*)     Bacteria, UA 1+ (*)     All other components within normal limits    Narrative:     Performed at:  [facility]) - measuring up to 3.7 cm. No flow within the mural nodules are found. Cysts are nonspecific, and may represent complex physiologic ovarian cysts,   but benign and malignant adnexal cysts remain in the differential.   Tubo-ovarian abscess would also be considered, though felt to be less likely. Based on the most recent recommendations, a follow-up MRI is recommended,   especially to evaluate the complex cyst with nodularity on the left. Gynecologic consultation recommended as well. No sonographic evidence of torsion. US PELVIS COMPLETE   Final Result   Complex, large cysts within both ovaries. On the right, largest measures 5.8 cm, with multiple thin septations. On the left, there is a complex cyst with septations and mural nodularity,   measuring up to 3.7 cm. No flow within the mural nodules are found. Cysts are nonspecific, and may represent complex physiologic ovarian cysts,   but benign and malignant adnexal cysts remain in the differential.   Tubo-ovarian abscess would also be considered, though felt to be less likely. Based on the most recent recommendations, a follow-up MRI is recommended,   especially to evaluate the complex cyst with nodularity on the left. Gynecologic consultation recommended as well. No sonographic evidence of torsion. US DUP ABD PEL RETRO SCROT COMPLETE   Final Result   Complex, large cysts within both ovaries. On the right, largest measures 5.8 cm, with multiple thin septations. On the left, there is a complex cyst with septations and mural nodularity,   measuring up to 3.7 cm. No flow within the mural nodules are found. Cysts are nonspecific, and may represent complex physiologic ovarian cysts,   but benign and malignant adnexal cysts remain in the differential.   Tubo-ovarian abscess would also be considered, though felt to be less likely.       Based on the most recent recommendations, a follow-up MRI is recommended,   especially to evaluate the complex cyst with nodularity on the left. Gynecologic consultation recommended as well. No sonographic evidence of torsion. CT ABDOMEN PELVIS W IV CONTRAST Additional Contrast? None   Final Result   No acute inflammatory process or evidence of bowel obstruction. Left renal calculi up to 3 mm in size. No evidence of obstructive uropathy. Bilateral ovarian cysts up to 5.6 cm in size. Based on size and   premenopausal status, follow-up pelvic ultrasound is recommended in 6-12   weeks. Small amount of free fluid dependent pelvis most likely physiologic finding. Mild splenomegaly. Small fat containing umbilical hernia. No results found. PROCEDURES   Unless otherwise noted below, none     Procedures    CRITICAL CARE TIME   N/A    CONSULTS:  None      EMERGENCY DEPARTMENT COURSE and DIFFERENTIALDIAGNOSIS/MDM:   Vitals:    Vitals:    04/25/19 1803 04/25/19 2051 04/25/19 2104 04/25/19 2133   BP: (!) 131/90 (!) 140/93 128/80 132/83   Pulse: 82 73     Resp: 16      Temp:       TempSrc:       SpO2: 100% 99% 95% 100%   Weight:       Height:           Patient was given thefollowing medications:  Medications   ondansetron (ZOFRAN) injection 4 mg (4 mg Intravenous Given 4/25/19 1752)   cefTRIAXone (ROCEPHIN) 2 g IVPB in D5W 50ml minibag (2 g Intravenous New Bag 4/25/19 2147)   ketorolac (TORADOL) injection 30 mg (30 mg Intravenous Given 4/25/19 1752)   0.9 % sodium chloride bolus (0 mLs Intravenous Stopped 4/25/19 1853)   iopamidol (ISOVUE-370) 76 % injection 75 mL (75 mLs Intravenous Given 4/25/19 1839)   doxycycline hyclate (VIBRA-TABS) tablet 100 mg (100 mg Oral Given 4/25/19 2147)       Briefly, this is a 33year old female that presents to the emergency department with right-sided pelvic pain/lower quadrant pain. States it is sharp and stabbing in nature, it is intermittent.   Reports that the pain earlier dropped her to her knees and it was worsening that she's experienced. States she is also experiencing dyspareunia but denies vaginal bleeding or discharge. She does have an implanted birth control device, denies concern for STD or pregnancy. She's never had abdominal surgery. She has irregular periods but believes she is due sometime soon. Pelvic exam revealed a tender cervix with +CMT present with bimanual exam.  Nurse chaperone present for exam.  Swabs sent to lab. Wet prep unremarkable. UA shows 2+ mucus and 1+ bacteria with 3-5 whites and 10-20 epis. CBC is unremarkable. No leukocytosis. CMP is unremarkable. Lactate normal.  Pregnancy negative. Lipase 39.0. Impression:    Complex, large cysts within both ovaries. On the right, largest measures 5.8 cm, with multiple thin septations. On the left, there is a complex cyst with septations and mural nodularity,  measuring up to 3.7 cm.  No flow within the mural nodules are found. Cysts are nonspecific, and may represent complex physiologic ovarian cysts,  but benign and malignant adnexal cysts remain in the differential.  Tubo-ovarian abscess would also be considered, though felt to be less likely. Based on the most recent recommendations, a follow-up MRI is recommended,  especially to evaluate the complex cyst with nodularity on the left. Gynecologic consultation recommended as well. No sonographic evidence of torsion. Impression:    No acute inflammatory process or evidence of bowel obstruction. Left renal calculi up to 3 mm in size.  No evidence of obstructive uropathy. Bilateral ovarian cysts up to 5.6 cm in size.  Based on size and  premenopausal status, follow-up pelvic ultrasound is recommended in 6-12  weeks. Small amount of free fluid dependent pelvis most likely physiologic finding. Mild splenomegaly. Small fat containing umbilical hernia.        toradol did give her limited pain relief. She was then given 2 percocet. GYN consulted at [facility], I spoke with Dr. Abdirahman Landry, we did review the patient's presentation as well as pelvic exam, CT and pelvic ultrasound findings. There is concern that the complex ovarian cyst without Doppler flow may be a tubal ovarian abscess. Recommended that the patient be transferred to [facility] for IV antibiotics. The patient is given a first dose of antibiotics, ceftriaxone 2 g IV and 100 mg of doxycycline orally here in the emergency department. The IV will be removed and the patient is transferred by family, OB is comfortable with family transfer. The patient is instructed to go directly to the emergency department, I did speak with Dr. Shobha Law, ER attending, he does accept this patient in transfer to be admitted by obstetrics. All questions were answered and the patient was transferred in good condition. FINAL IMPRESSION      1. Ovarian cyst, complex    2. Pelvic pain          DISPOSITION/PLAN   DISPOSITION Decision To Transfer 04/25/2019 09:49:56 PM      PATIENT REFERREDTO:  No follow-up provider specified.     DISCHARGE MEDICATIONS:  New Prescriptions    No medications on file       DISCONTINUED MEDICATIONS:  Discontinued Medications    No medications on file              (Please note that portions ofthis note were completed with a voice recognition program.  Efforts were made to edit the dictations but occasionally words are mis-transcribed.)    DEMETRIS Thurston CNP (electronically signed)           DEMETRIS Thurston CNP  04/25/19 4942

## (undated) DEVICE — Y-TYPE TUR/BLADDER IRRIGATION SET, REGULATING CLAMP

## (undated) DEVICE — TOTAL TRAY, DB, 100% SILI FOLEY, 16FR 10: Brand: MEDLINE

## (undated) DEVICE — INTENDED FOR TISSUE SEPARATION, AND OTHER PROCEDURES THAT REQUIRE A SHARP SURGICAL BLADE TO PUNCTURE OR CUT.: Brand: BARD-PARKER ® CARBON RIB-BACK BLADES

## (undated) DEVICE — Device

## (undated) DEVICE — DRAPE,UNDERBUTTOCKS,PCH,STERILE: Brand: MEDLINE

## (undated) DEVICE — 3M™ TEGADERM™ TRANSPARENT FILM DRESSING FRAME STYLE, 1624W, 2-3/8 IN X 2-3/4 IN (6 CM X 7 CM), 100/CT 4CT/CASE: Brand: 3M™ TEGADERM™

## (undated) DEVICE — TROCAR ENDOSCP L100MM DIA5MM BLDELSS STBL SL OBT RADLUC

## (undated) DEVICE — GAUZE,SPONGE,2"X2",8PLY,STERILE,LF,2'S: Brand: MEDLINE

## (undated) DEVICE — 3M™ STERI-STRIP™ REINFORCED ADHESIVE SKIN CLOSURES, R1547, 1/2 IN X 4 IN (12 MM X 100 MM), 6 STRIPS/ENVELOPE: Brand: 3M™ STERI-STRIP™

## (undated) DEVICE — GLOVE SURG SZ 65 THK91MIL LTX FREE SYN POLYISOPRENE

## (undated) DEVICE — Z DISCONTINUED PER MEDLINE TRAY SKIN PREP DRY W/ PREM GLV APPLICATORS GZ TWL OVERWRAP

## (undated) DEVICE — NEEDLE HYPO 22GA L1.5IN BLK POLYPR HUB S STL REG BVL STR

## (undated) DEVICE — SYRINGE MED 10ML POLYPR GEN PURP FLAT TOP LUERLOCK TIP

## (undated) DEVICE — GOWN,AURORA,NONREINFORCED,LARGE: Brand: MEDLINE

## (undated) DEVICE — SUTURE VCRL + SZ 4-0 L18IN ABSRB UD L19MM PS-2 3/8 CIR PRIM VCP496H

## (undated) DEVICE — APPLICATOR ENDOSCP L34CM W/ S STL CANN PLAS OBT STYL FOR

## (undated) DEVICE — SHEARS ENDOSCP L36CM DIA5MM ULTRASONIC CRV TIP W/ ADV

## (undated) DEVICE — PAD TBL OP RM TRENDELENBURG STATIC TORSO W/STRAPS

## (undated) DEVICE — D&C: Brand: MEDLINE INDUSTRIES, INC.

## (undated) DEVICE — PUMP SUC IRR TBNG L10FT W/ HNDPC ASSEMB STRYKEFLOW 2

## (undated) DEVICE — PACK PROCEDURE SURG GYN LAP CDS

## (undated) DEVICE — 20 ML SYRINGE LUER-LOCK TIP: Brand: MONOJECT

## (undated) DEVICE — PENCIL ES CRD L10FT HND SWCHING ROCK SWCH W/ EDGE COAT BLDE